# Patient Record
Sex: FEMALE | Race: BLACK OR AFRICAN AMERICAN | Employment: FULL TIME | URBAN - METROPOLITAN AREA
[De-identification: names, ages, dates, MRNs, and addresses within clinical notes are randomized per-mention and may not be internally consistent; named-entity substitution may affect disease eponyms.]

---

## 2022-04-26 ENCOUNTER — APPOINTMENT (OUTPATIENT)
Dept: GENERAL RADIOLOGY | Age: 42
End: 2022-04-26
Attending: EMERGENCY MEDICINE
Payer: MEDICAID

## 2022-04-26 ENCOUNTER — APPOINTMENT (OUTPATIENT)
Dept: CT IMAGING | Age: 42
End: 2022-04-26
Attending: EMERGENCY MEDICINE
Payer: MEDICAID

## 2022-04-26 ENCOUNTER — HOSPITAL ENCOUNTER (OUTPATIENT)
Age: 42
Setting detail: OBSERVATION
Discharge: HOME | End: 2022-04-28
Attending: EMERGENCY MEDICINE | Admitting: FAMILY MEDICINE
Payer: MEDICAID

## 2022-04-26 DIAGNOSIS — R77.8 ELEVATED TROPONIN: ICD-10-CM

## 2022-04-26 DIAGNOSIS — M54.2 NECK PAIN ON LEFT SIDE: ICD-10-CM

## 2022-04-26 DIAGNOSIS — R94.31 ABNORMAL EKG: ICD-10-CM

## 2022-04-26 DIAGNOSIS — R53.1 LEFT-SIDED WEAKNESS: Primary | ICD-10-CM

## 2022-04-26 LAB
ALBUMIN SERPL-MCNC: 4 G/DL (ref 3.4–5)
ALBUMIN/GLOB SERPL: 1.2 {RATIO} (ref 0.8–1.7)
ALP SERPL-CCNC: 81 U/L (ref 45–117)
ALT SERPL-CCNC: 18 U/L (ref 13–56)
AMPHET UR QL SCN: NEGATIVE
ANION GAP SERPL CALC-SCNC: 1 MMOL/L (ref 3–18)
APPEARANCE UR: CLEAR
APTT PPP: 23.8 SEC (ref 23–36.4)
AST SERPL-CCNC: 21 U/L (ref 10–38)
ATRIAL RATE: 89 BPM
ATRIAL RATE: 93 BPM
BARBITURATES UR QL SCN: NEGATIVE
BASOPHILS # BLD: 0 K/UL (ref 0–0.1)
BASOPHILS NFR BLD: 1 % (ref 0–2)
BENZODIAZ UR QL: NEGATIVE
BILIRUB DIRECT SERPL-MCNC: 0.1 MG/DL (ref 0–0.2)
BILIRUB SERPL-MCNC: 0.3 MG/DL (ref 0.2–1)
BILIRUB UR QL: NEGATIVE
BUN SERPL-MCNC: 10 MG/DL (ref 7–18)
BUN/CREAT SERPL: 10 (ref 12–20)
CALCIUM SERPL-MCNC: 9 MG/DL (ref 8.5–10.1)
CALCULATED P AXIS, ECG09: 58 DEGREES
CALCULATED P AXIS, ECG09: 68 DEGREES
CALCULATED R AXIS, ECG10: 25 DEGREES
CALCULATED R AXIS, ECG10: 43 DEGREES
CALCULATED T AXIS, ECG11: 25 DEGREES
CALCULATED T AXIS, ECG11: 46 DEGREES
CANNABINOIDS UR QL SCN: POSITIVE
CHLORIDE SERPL-SCNC: 105 MMOL/L (ref 100–111)
CO2 SERPL-SCNC: 29 MMOL/L (ref 21–32)
COCAINE UR QL SCN: NEGATIVE
COLOR UR: YELLOW
CREAT SERPL-MCNC: 1 MG/DL (ref 0.6–1.3)
DIAGNOSIS, 93000: NORMAL
DIAGNOSIS, 93000: NORMAL
DIFFERENTIAL METHOD BLD: ABNORMAL
EOSINOPHIL # BLD: 0.2 K/UL (ref 0–0.4)
EOSINOPHIL NFR BLD: 3 % (ref 0–5)
ERYTHROCYTE [DISTWIDTH] IN BLOOD BY AUTOMATED COUNT: 16.9 % (ref 11.6–14.5)
GLOBULIN SER CALC-MCNC: 3.3 G/DL (ref 2–4)
GLUCOSE BLD STRIP.AUTO-MCNC: 99 MG/DL (ref 70–110)
GLUCOSE SERPL-MCNC: 83 MG/DL (ref 74–99)
GLUCOSE UR STRIP.AUTO-MCNC: NEGATIVE MG/DL
HCG SERPL QL: NEGATIVE
HCT VFR BLD AUTO: 35.8 % (ref 35–45)
HDSCOM,HDSCOM: ABNORMAL
HGB BLD-MCNC: 10.6 G/DL (ref 12–16)
HGB UR QL STRIP: NEGATIVE
IMM GRANULOCYTES # BLD AUTO: 0 K/UL (ref 0–0.04)
IMM GRANULOCYTES NFR BLD AUTO: 0 % (ref 0–0.5)
INR PPP: 1.1 (ref 0.8–1.2)
KETONES UR QL STRIP.AUTO: NEGATIVE MG/DL
LEUKOCYTE ESTERASE UR QL STRIP.AUTO: NEGATIVE
LYMPHOCYTES # BLD: 3.2 K/UL (ref 0.9–3.6)
LYMPHOCYTES NFR BLD: 51 % (ref 21–52)
MCH RBC QN AUTO: 23.4 PG (ref 24–34)
MCHC RBC AUTO-ENTMCNC: 29.6 G/DL (ref 31–37)
MCV RBC AUTO: 79 FL (ref 78–100)
METHADONE UR QL: NEGATIVE
MONOCYTES # BLD: 0.6 K/UL (ref 0.05–1.2)
MONOCYTES NFR BLD: 9 % (ref 3–10)
NEUTS SEG # BLD: 2.3 K/UL (ref 1.8–8)
NEUTS SEG NFR BLD: 36 % (ref 40–73)
NITRITE UR QL STRIP.AUTO: NEGATIVE
NRBC # BLD: 0 K/UL (ref 0–0.01)
NRBC BLD-RTO: 0 PER 100 WBC
OPIATES UR QL: POSITIVE
P-R INTERVAL, ECG05: 188 MS
P-R INTERVAL, ECG05: 198 MS
PCP UR QL: NEGATIVE
PH UR STRIP: 7 [PH] (ref 5–8)
PLATELET # BLD AUTO: 315 K/UL (ref 135–420)
PMV BLD AUTO: 9.8 FL (ref 9.2–11.8)
POTASSIUM SERPL-SCNC: 4.2 MMOL/L (ref 3.5–5.5)
PROT SERPL-MCNC: 7.3 G/DL (ref 6.4–8.2)
PROT UR STRIP-MCNC: NEGATIVE MG/DL
PROTHROMBIN TIME: 13.2 SEC (ref 11.5–15.2)
Q-T INTERVAL, ECG07: 350 MS
Q-T INTERVAL, ECG07: 372 MS
QRS DURATION, ECG06: 66 MS
QRS DURATION, ECG06: 76 MS
QTC CALCULATION (BEZET), ECG08: 435 MS
QTC CALCULATION (BEZET), ECG08: 452 MS
RBC # BLD AUTO: 4.53 M/UL (ref 4.2–5.3)
SODIUM SERPL-SCNC: 135 MMOL/L (ref 136–145)
SP GR UR REFRACTOMETRY: >1.03 (ref 1–1.03)
TROPONIN-HIGH SENSITIVITY: 107 NG/L (ref 0–54)
TROPONIN-HIGH SENSITIVITY: 93 NG/L (ref 0–54)
UROBILINOGEN UR QL STRIP.AUTO: 0.2 EU/DL (ref 0.2–1)
VENTRICULAR RATE, ECG03: 89 BPM
VENTRICULAR RATE, ECG03: 93 BPM
WBC # BLD AUTO: 6.2 K/UL (ref 4.6–13.2)

## 2022-04-26 PROCEDURE — 96366 THER/PROPH/DIAG IV INF ADDON: CPT

## 2022-04-26 PROCEDURE — 74011250637 HC RX REV CODE- 250/637: Performed by: EMERGENCY MEDICINE

## 2022-04-26 PROCEDURE — 96376 TX/PRO/DX INJ SAME DRUG ADON: CPT

## 2022-04-26 PROCEDURE — 85730 THROMBOPLASTIN TIME PARTIAL: CPT

## 2022-04-26 PROCEDURE — 74011000250 HC RX REV CODE- 250: Performed by: FAMILY MEDICINE

## 2022-04-26 PROCEDURE — 80076 HEPATIC FUNCTION PANEL: CPT

## 2022-04-26 PROCEDURE — 93005 ELECTROCARDIOGRAM TRACING: CPT

## 2022-04-26 PROCEDURE — 65270000046 HC RM TELEMETRY

## 2022-04-26 PROCEDURE — G0378 HOSPITAL OBSERVATION PER HR: HCPCS

## 2022-04-26 PROCEDURE — 94762 N-INVAS EAR/PLS OXIMTRY CONT: CPT

## 2022-04-26 PROCEDURE — 71045 X-RAY EXAM CHEST 1 VIEW: CPT

## 2022-04-26 PROCEDURE — 74011250637 HC RX REV CODE- 250/637: Performed by: FAMILY MEDICINE

## 2022-04-26 PROCEDURE — 82962 GLUCOSE BLOOD TEST: CPT

## 2022-04-26 PROCEDURE — 99285 EMERGENCY DEPT VISIT HI MDM: CPT

## 2022-04-26 PROCEDURE — 74011250636 HC RX REV CODE- 250/636: Performed by: EMERGENCY MEDICINE

## 2022-04-26 PROCEDURE — 96375 TX/PRO/DX INJ NEW DRUG ADDON: CPT

## 2022-04-26 PROCEDURE — 85025 COMPLETE CBC W/AUTO DIFF WBC: CPT

## 2022-04-26 PROCEDURE — 80048 BASIC METABOLIC PNL TOTAL CA: CPT

## 2022-04-26 PROCEDURE — 84703 CHORIONIC GONADOTROPIN ASSAY: CPT

## 2022-04-26 PROCEDURE — 85610 PROTHROMBIN TIME: CPT

## 2022-04-26 PROCEDURE — 84484 ASSAY OF TROPONIN QUANT: CPT

## 2022-04-26 PROCEDURE — 70450 CT HEAD/BRAIN W/O DYE: CPT

## 2022-04-26 PROCEDURE — 96365 THER/PROPH/DIAG IV INF INIT: CPT

## 2022-04-26 PROCEDURE — 74011000636 HC RX REV CODE- 636: Performed by: EMERGENCY MEDICINE

## 2022-04-26 PROCEDURE — 80307 DRUG TEST PRSMV CHEM ANLYZR: CPT

## 2022-04-26 PROCEDURE — 71275 CT ANGIOGRAPHY CHEST: CPT

## 2022-04-26 PROCEDURE — 70496 CT ANGIOGRAPHY HEAD: CPT

## 2022-04-26 PROCEDURE — 81003 URINALYSIS AUTO W/O SCOPE: CPT

## 2022-04-26 RX ORDER — MAGNESIUM SULFATE HEPTAHYDRATE 40 MG/ML
2 INJECTION, SOLUTION INTRAVENOUS ONCE
Status: COMPLETED | OUTPATIENT
Start: 2022-04-26 | End: 2022-04-26

## 2022-04-26 RX ORDER — MORPHINE SULFATE 4 MG/ML
4 INJECTION INTRAVENOUS
Status: COMPLETED | OUTPATIENT
Start: 2022-04-26 | End: 2022-04-26

## 2022-04-26 RX ORDER — ONDANSETRON 4 MG/1
4 TABLET, ORALLY DISINTEGRATING ORAL
Status: DISCONTINUED | OUTPATIENT
Start: 2022-04-26 | End: 2022-04-28 | Stop reason: HOSPADM

## 2022-04-26 RX ORDER — ONDANSETRON 2 MG/ML
4 INJECTION INTRAMUSCULAR; INTRAVENOUS
Status: DISCONTINUED | OUTPATIENT
Start: 2022-04-26 | End: 2022-04-28 | Stop reason: HOSPADM

## 2022-04-26 RX ORDER — POLYETHYLENE GLYCOL 3350 17 G/17G
17 POWDER, FOR SOLUTION ORAL DAILY PRN
Status: DISCONTINUED | OUTPATIENT
Start: 2022-04-26 | End: 2022-04-28 | Stop reason: HOSPADM

## 2022-04-26 RX ORDER — SODIUM CHLORIDE 0.9 % (FLUSH) 0.9 %
5-40 SYRINGE (ML) INJECTION AS NEEDED
Status: DISCONTINUED | OUTPATIENT
Start: 2022-04-26 | End: 2022-04-28 | Stop reason: HOSPADM

## 2022-04-26 RX ORDER — ACETAMINOPHEN 650 MG/1
650 SUPPOSITORY RECTAL
Status: DISCONTINUED | OUTPATIENT
Start: 2022-04-26 | End: 2022-04-28 | Stop reason: HOSPADM

## 2022-04-26 RX ORDER — LORAZEPAM 2 MG/ML
1 INJECTION INTRAMUSCULAR
Status: COMPLETED | OUTPATIENT
Start: 2022-04-26 | End: 2022-04-26

## 2022-04-26 RX ORDER — SODIUM CHLORIDE 0.9 % (FLUSH) 0.9 %
5-40 SYRINGE (ML) INJECTION EVERY 8 HOURS
Status: DISCONTINUED | OUTPATIENT
Start: 2022-04-26 | End: 2022-04-28 | Stop reason: HOSPADM

## 2022-04-26 RX ORDER — ACETAMINOPHEN 325 MG/1
650 TABLET ORAL
Status: DISCONTINUED | OUTPATIENT
Start: 2022-04-26 | End: 2022-04-28 | Stop reason: HOSPADM

## 2022-04-26 RX ORDER — ENOXAPARIN SODIUM 100 MG/ML
40 INJECTION SUBCUTANEOUS DAILY
Status: DISCONTINUED | OUTPATIENT
Start: 2022-04-27 | End: 2022-04-28 | Stop reason: HOSPADM

## 2022-04-26 RX ORDER — FAMOTIDINE 20 MG/1
20 TABLET, FILM COATED ORAL 2 TIMES DAILY
Status: DISCONTINUED | OUTPATIENT
Start: 2022-04-26 | End: 2022-04-28 | Stop reason: HOSPADM

## 2022-04-26 RX ORDER — ASPIRIN 325 MG
325 TABLET ORAL
Status: COMPLETED | OUTPATIENT
Start: 2022-04-26 | End: 2022-04-26

## 2022-04-26 RX ADMIN — IOPAMIDOL 100 ML: 755 INJECTION, SOLUTION INTRAVENOUS at 20:27

## 2022-04-26 RX ADMIN — ASPIRIN 325 MG ORAL TABLET 325 MG: 325 PILL ORAL at 21:50

## 2022-04-26 RX ADMIN — LORAZEPAM 1 MG: 2 INJECTION INTRAMUSCULAR at 18:32

## 2022-04-26 RX ADMIN — SODIUM CHLORIDE 1000 ML: 9 INJECTION, SOLUTION INTRAVENOUS at 19:37

## 2022-04-26 RX ADMIN — SODIUM CHLORIDE 1000 ML: 900 INJECTION, SOLUTION INTRAVENOUS at 18:32

## 2022-04-26 RX ADMIN — MORPHINE SULFATE 4 MG: 4 INJECTION INTRAVENOUS at 19:54

## 2022-04-26 RX ADMIN — MORPHINE SULFATE 4 MG: 4 INJECTION INTRAVENOUS at 21:50

## 2022-04-26 RX ADMIN — SODIUM CHLORIDE, PRESERVATIVE FREE 10 ML: 5 INJECTION INTRAVENOUS at 22:16

## 2022-04-26 RX ADMIN — IOPAMIDOL 100 ML: 755 INJECTION, SOLUTION INTRAVENOUS at 17:45

## 2022-04-26 RX ADMIN — MAGNESIUM SULFATE HEPTAHYDRATE 2 G: 40 INJECTION, SOLUTION INTRAVENOUS at 18:32

## 2022-04-26 RX ADMIN — NITROGLYCERIN 0.5 INCH: 20 OINTMENT TOPICAL at 22:12

## 2022-04-26 NOTE — ED NOTES
Pt  To CT with Rn  Earrings/necklace with mom  Pt c/o Lneck-arm pain with elbow numbness starting at 1300  [id number]

## 2022-04-26 NOTE — ED PROVIDER NOTES
EMERGENCY DEPARTMENT HISTORY AND PHYSICAL EXAM    Date: 4/26/2022  Patient Name: Bebeto Lopez    History of Presenting Illness     Chief Complaint   Patient presents with    Shortness of Breath    Neck Pain         History Provided By: Patient and Patient's Mother    5:25 PM  Bebeto Lopez is a 43 y.o. female with PMHX of prior appendectomy who presents to the emergency department C/O left-sided pain and weakness. Patient reports she was feeling well when she woke up at 7 AM today but then started to have pain over the left posterior part of her neck. She reports this is steadily worsened throughout the day and is now spreading down her left arm and the left side of her back and her left leg and into the left side of her head. She reports she is weak on the left side and has difficulty moving her left arm and left leg as well. She denies numbness, head strike, blood thinner use. No fever or cough but is complaining of chest pain and shortness of breath. Denies any recent injuries or trauma. Denies any hormone or birth control use. No family history of heart disease. Does smoke.      PCP: None    Current Facility-Administered Medications   Medication Dose Route Frequency Provider Last Rate Last Admin    nitroglycerin (NITROBID) 2 % ointment 0.5 Inch  0.5 Inch Topical BID Aleksandr Dailey MD   0.5 Inch at 04/26/22 2212    sodium chloride (NS) flush 5-40 mL  5-40 mL IntraVENous Q8H Aleksandr Dailey MD        sodium chloride (NS) flush 5-40 mL  5-40 mL IntraVENous PRN Aleksandr Dailey MD        acetaminophen (TYLENOL) tablet 650 mg  650 mg Oral Q6H PRN Alekasndr Dailey MD        Or    acetaminophen (TYLENOL) suppository 650 mg  650 mg Rectal Q6H PRN Aleksandr Dailey MD        polyethylene glycol (MIRALAX) packet 17 g  17 g Oral DAILY PRN Aelksandr Dailey MD        ondansetron (ZOFRAN ODT) tablet 4 mg  4 mg Oral Q8H PRN Aleksandr Dailey MD        Or    ondansetron [facility]) injection 4 mg  4 mg IntraVENous Q6H PRN Jason Godoy MD        [START ON 2022] enoxaparin (LOVENOX) injection 40 mg  40 mg SubCUTAneous DAILY Jason Godoy MD        famotidine (PEPCID) tablet 20 mg  20 mg Oral BID Jason Godoy MD           Past History       Past Medical History:  History reviewed. No pertinent past medical history. Past Surgical History:  Past Surgical History:   Procedure Laterality Date    HX APPENDECTOMY      HX GYN       x 3    HX ORTHOPAEDIC      knee       Family History:  History reviewed. No pertinent family history. Social History:  Social History     Tobacco Use    Smoking status: Current Every Day Smoker     Packs/day: 0.50    Smokeless tobacco: Never Used   Substance Use Topics    Alcohol use: Yes    Drug use: Never       Allergies:  No Known Allergies      Review of Systems   Review of Systems   Constitutional: Negative for fever. Respiratory: Positive for shortness of breath. Cardiovascular: Positive for chest pain. Gastrointestinal: Negative for abdominal pain. Musculoskeletal: Positive for arthralgias, back pain, myalgias and neck pain. Neurological: Positive for weakness and headaches. All other systems reviewed and are negative.         Physical Exam     Vitals:    22 1715 22 2102 22 2212 22 2232   BP: (!) 152/80 (!) 140/94 (!) 137/90 137/87   Pulse: 94 80 90 80   Resp: 18 19  15   Temp: 97.3 °F (36.3 °C)      SpO2: 98% 99%  100%   Weight: 79.8 kg (176 lb)      Height: 5' 2\" (1.575 m)        Physical Exam    Nursing notes and vital signs reviewed    Constitutional: Non toxic appearing, moderate distress  Head: Normocephalic, Atraumatic  Eyes: EOMI  Neck: Supple  Cardiovascular: Regular rate and rhythm, no murmurs, rubs, or gallops  Chest: Normal work of breathing and chest excursion bilaterally  Lungs: Clear to ausculation bilaterally  Abdomen: Soft, non tender, non distended  Back: No evidence of trauma or deformity  Extremities: No evidence of trauma or deformity, no LE edema  Skin: Warm and dry, normal cap refill  Neuro: Alert and appropriate, CN intact, normal speech, patient unable to lift left arm or left leg off the bed. Unclear if this is due to pain, sensation is intact in both his extremities.  strength is also reduced in the left hand. Psychiatric: Anxious mood and affect      Diagnostic Study Results     Labs -     Recent Results (from the past 12 hour(s))   GLUCOSE, POC    Collection Time: 04/26/22  5:29 PM   Result Value Ref Range    Glucose (POC) 99 70 - 110 mg/dL   CBC WITH AUTOMATED DIFF    Collection Time: 04/26/22  6:15 PM   Result Value Ref Range    WBC 6.2 4.6 - 13.2 K/uL    RBC 4.53 4.20 - 5.30 M/uL    HGB 10.6 (L) 12.0 - 16.0 g/dL    HCT 35.8 35.0 - 45.0 %    MCV 79.0 78.0 - 100.0 FL    MCH 23.4 (L) 24.0 - 34.0 PG    MCHC 29.6 (L) 31.0 - 37.0 g/dL    RDW 16.9 (H) 11.6 - 14.5 %    PLATELET 761 897 - 154 K/uL    MPV 9.8 9.2 - 11.8 FL    NRBC 0.0 0  WBC    ABSOLUTE NRBC 0.00 0.00 - 0.01 K/uL    NEUTROPHILS 36 (L) 40 - 73 %    LYMPHOCYTES 51 21 - 52 %    MONOCYTES 9 3 - 10 %    EOSINOPHILS 3 0 - 5 %    BASOPHILS 1 0 - 2 %    IMMATURE GRANULOCYTES 0 0.0 - 0.5 %    ABS. NEUTROPHILS 2.3 1.8 - 8.0 K/UL    ABS. LYMPHOCYTES 3.2 0.9 - 3.6 K/UL    ABS. MONOCYTES 0.6 0.05 - 1.2 K/UL    ABS. EOSINOPHILS 0.2 0.0 - 0.4 K/UL    ABS. BASOPHILS 0.0 0.0 - 0.1 K/UL    ABS. IMM.  GRANS. 0.0 0.00 - 0.04 K/UL    DF AUTOMATED     METABOLIC PANEL, BASIC    Collection Time: 04/26/22  6:15 PM   Result Value Ref Range    Sodium 135 (L) 136 - 145 mmol/L    Potassium 4.2 3.5 - 5.5 mmol/L    Chloride 105 100 - 111 mmol/L    CO2 29 21 - 32 mmol/L    Anion gap 1 (L) 3.0 - 18 mmol/L    Glucose 83 74 - 99 mg/dL    BUN 10 7.0 - 18 MG/DL    Creatinine 1.00 0.6 - 1.3 MG/DL    BUN/Creatinine ratio 10 (L) 12 - 20      GFR est AA >60 >60 ml/min/1.73m2    GFR est non-AA >60 >60 ml/min/1.73m2    Calcium 9.0 8.5 - 10.1 MG/DL   PROTHROMBIN TIME + INR    Collection Time: 04/26/22  6:15 PM   Result Value Ref Range    Prothrombin time 13.2 11.5 - 15.2 sec    INR 1.1 0.8 - 1.2     TROPONIN-HIGH SENSITIVITY    Collection Time: 04/26/22  6:15 PM   Result Value Ref Range    Troponin-High Sensitivity 107 (HH) 0 - 54 ng/L   HCG QL SERUM    Collection Time: 04/26/22  6:15 PM   Result Value Ref Range    HCG, Ql. Negative NEG     PTT    Collection Time: 04/26/22  6:15 PM   Result Value Ref Range    aPTT 23.8 23.0 - 36.4 SEC   HEPATIC FUNCTION PANEL    Collection Time: 04/26/22  6:15 PM   Result Value Ref Range    Protein, total 7.3 6.4 - 8.2 g/dL    Albumin 4.0 3.4 - 5.0 g/dL    Globulin 3.3 2.0 - 4.0 g/dL    A-G Ratio 1.2 0.8 - 1.7      Bilirubin, total 0.3 0.2 - 1.0 MG/DL    Bilirubin, direct 0.1 0.0 - 0.2 MG/DL    Alk.  phosphatase 81 45 - 117 U/L    AST (SGOT) 21 10 - 38 U/L    ALT (SGPT) 18 13 - 56 U/L   EKG, 12 LEAD, INITIAL    Collection Time: 04/26/22  6:15 PM   Result Value Ref Range    Ventricular Rate 93 BPM    Atrial Rate 93 BPM    P-R Interval 198 ms    QRS Duration 76 ms    Q-T Interval 350 ms    QTC Calculation (Bezet) 435 ms    Calculated P Axis 68 degrees    Calculated R Axis 43 degrees    Calculated T Axis 46 degrees    Diagnosis       Poor data quality, interpretation may be adversely affected  Normal sinus rhythm  Possible Left atrial enlargement  Septal infarct , age undetermined  Abnormal ECG  Confirmed by Yelena Self MD, Shay Molina ([id number]) on 4/26/2022 10:09:22 PM     TROPONIN-HIGH SENSITIVITY    Collection Time: 04/26/22  7:38 PM   Result Value Ref Range    Troponin-High Sensitivity 93 (HH) 0 - 54 ng/L   EKG, 12 LEAD, INITIAL    Collection Time: 04/26/22  7:49 PM   Result Value Ref Range    Ventricular Rate 89 BPM    Atrial Rate 89 BPM    P-R Interval 188 ms    QRS Duration 66 ms    Q-T Interval 372 ms    QTC Calculation (Bezet) 452 ms    Calculated P Axis 58 degrees    Calculated R Axis 25 degrees    Calculated T Axis 25 degrees    Diagnosis       Poor data quality, interpretation may be adversely affected  Normal sinus rhythm  Possible Anteroseptal infarct , age undetermined  Abnormal ECG  Confirmed by Pop Fraser MD, Katheran Seat ([id number]) on 4/26/2022 10:10:22 PM     URINALYSIS W/ RFLX MICROSCOPIC    Collection Time: 04/26/22  8:15 PM   Result Value Ref Range    Color YELLOW      Appearance CLEAR      Specific gravity >1.030 (H) 1.005 - 1.030    pH (UA) 7.0 5.0 - 8.0      Protein Negative NEG mg/dL    Glucose Negative NEG mg/dL    Ketone Negative NEG mg/dL    Bilirubin Negative NEG      Blood Negative NEG      Urobilinogen 0.2 0.2 - 1.0 EU/dL    Nitrites Negative NEG      Leukocyte Esterase Negative NEG     DRUG SCREEN, URINE    Collection Time: 04/26/22  9:30 PM   Result Value Ref Range    BENZODIAZEPINES Negative NEG      BARBITURATES Negative NEG      THC (TH-CANNABINOL) Positive (A) NEG      OPIATES Positive (A) NEG      PCP(PHENCYCLIDINE) Negative NEG      COCAINE Negative NEG      AMPHETAMINES Negative NEG      METHADONE Negative NEG      HDSCOM (NOTE)        Radiologic Studies -   CTA CHEST ABD PELV W CONT   Final Result      No acute vascular or nonvascular abnormality identified. Mildly enlarged right axillary/prepectoral lymph node measuring 1.6 cm is   nonspecific, likely reactive in nature. Recommend clinical follow-up. No other   suspicious adenopathy or lesion identified. CT HEAD WO CONT   Final Result      1. No acute intracranial pathology. Above code S stroke alert results relayed to patient's ER provider at 5:45 PM   4/26/2022. CTA HEAD NECK W WO CONT    (Results Pending)   XR CHEST PORT    (Results Pending)     CT Results  (Last 48 hours)               04/26/22 2039  CTA CHEST ABD PELV W CONT Final result    Impression:      No acute vascular or nonvascular abnormality identified.        Mildly enlarged right axillary/prepectoral lymph node measuring 1.6 cm is   nonspecific, likely reactive in nature. Recommend clinical follow-up. No other   suspicious adenopathy or lesion identified. Narrative:  EXAM: CT angiogram of the chest, abdomen and pelvis       INDICATION: Chest pain, concern for dissection       COMPARISON: None. Technique: CT arteriogram of the chest abdomen pelvis was performed as a volume   acquisition after intravenous contrast without complication. Coronal and   sagittal MIP reconstructions were performed in order to create angiographic   images of the arterial vasculature and to increase accuracy for detection of   vascular abnormality. One or more dose reduction techniques were used on this   CT: automated exposure control, adjustment of the mAs and/or kVp according to   patient size, and iterative reconstruction techniques. The specific techniques   used on this CT exam have been documented in the patient's electronic medical   record. Digital Imaging and Communications in Medicine (DICOM) format image   data are available to nonaffiliated external healthcare facilities or entities   on a secure, media free, reciprocally searchable basis with patient   authorization for at least a 12-month period after this study. Elton Case FINDINGS       VASCULATURE: Thoracic and abdominal aorta nonaneurysmal. No dissection or acute   abnormality. Visualized great vessels are widely patent. Visualized pulmonary   arteries widely patent. Abdominal visceral and mesenteric vasculature widely   patent. Iliac arterial vasculature widely patent. NONVASCULAR FINDINGS:       CHEST: Unremarkable. MEDIASTINUM: Heart size normal. No pericardial effusion. LIVER, BILIARY: Liver is normal. No biliary dilation. Gallbladder is   unremarkable. PANCREAS: Normal.       SPLEEN: Normal.       ADRENALS: Normal.       KIDNEYS: Normal.       LYMPH NODES: Right axillary/prepectoral lymph node measuring 1.6 cm.        GASTROINTESTINAL TRACT: No bowel dilation or wall thickening. PELVIC ORGANS: Simple appearing right ovarian cyst measuring 3.7 cm. .       BONES: No acute or aggressive osseous abnormalities identified. OTHER: None.       _______________           04/26/22 1737  CT HEAD WO CONT Final result    Impression:      1. No acute intracranial pathology. Above code S stroke alert results relayed to patient's ER provider at 5:45 PM   4/26/2022. Narrative:  EXAMINATION:  CT head noncontrast       COMPARISON: None       HISTORY: Stroke alert, history of stroke like symptoms. TECHNIQUE: Noncontrasted axial images were obtained through the patient's brain   from the vertex of the skull through the skull base. Bone and soft tissue   windows were reviewed. One or more dose reduction techniques were used on this   CT: automated exposure control, adjustment of the mAs and/or kVp according to   patient size, and iterative reconstruction techniques. The specific techniques   used on this CT exam have been documented in the patient's electronic medical   record. Digital Imaging and Communications in Medicine (DICOM) format image   data are available to nonaffiliated external healthcare facilities or entities   on a secure, media free, reciprocally searchable basis with patient   authorization for at least a 12-month period after this study. Kin Velazquezr FINDINGS: Brain architecture is normal. No mass effect, midline shift or   hemorrhage. Ventricles are normal in size, position and configuration. No   abnormal extra-axial fluid collections are seen. No territorial signs of   infarct. The bony calvarium appears intact without acute displaced fracture. The   visualized paranasal sinuses and mastoid air cells are aerated.                CXR Results  (Last 48 hours)    None          Medications given in the ED-  Medications   nitroglycerin (NITROBID) 2 % ointment 0.5 Inch (0.5 Inches Topical Given 4/26/22 2212)   sodium chloride (NS) flush 5-40 mL (has no administration in time range)   sodium chloride (NS) flush 5-40 mL (has no administration in time range)   acetaminophen (TYLENOL) tablet 650 mg (has no administration in time range)     Or   acetaminophen (TYLENOL) suppository 650 mg (has no administration in time range)   polyethylene glycol (MIRALAX) packet 17 g (has no administration in time range)   ondansetron (ZOFRAN ODT) tablet 4 mg (has no administration in time range)     Or   ondansetron (ZOFRAN) injection 4 mg (has no administration in time range)   enoxaparin (LOVENOX) injection 40 mg (has no administration in time range)   famotidine (PEPCID) tablet 20 mg (has no administration in time range)   iopamidoL (ISOVUE-370) 76 % injection 100 mL (100 mL IntraVENous Given 4/26/22 1745)   sodium chloride 0.9 % bolus infusion 1,000 mL (1,000 mL IntraVENous New Bag 4/26/22 1832)   magnesium sulfate 2 g/50 ml IVPB (premix or compounded) (0 g IntraVENous IV Completed 4/26/22 2032)   LORazepam (ATIVAN) injection 1 mg (1 mg IntraVENous Given 4/26/22 1832)   sodium chloride 0.9 % bolus infusion 1,000 mL (1,000 mL IntraVENous New Bag 4/26/22 1937)   morphine injection 4 mg (4 mg IntraVENous Given 4/26/22 1954)   iopamidoL (ISOVUE-370) 76 % injection 100 mL (100 mL IntraVENous Given 4/26/22 2027)   morphine injection 4 mg (4 mg IntraVENous Given 4/26/22 2150)   aspirin tablet 325 mg (325 mg Oral Given 4/26/22 2150)         Medical Decision Making   I am the first provider for this patient. I reviewed the vital signs, available nursing notes, past medical history, past surgical history, family history and social history. Vital Signs-Reviewed the patient's vital signs.     Pulse Oximetry Analysis - 98% on room air, not hypoxic     EKG interpretation: (Preliminary)  EKG read by Dr. Bibi Brewster at 6:19 PM  Normal sinus rhythm at rate of 93 bpm, IN interval 198 ms, QRS duration of 76 ms, no prior available for comparison    Repeat EKG interpretation: (Preliminary)  EKG read by Dr. Xavier Moser at 7:59 PM  Normal sinus rhythm at a rate of 89 bpm, NV interval 180 ms, QRS duration 66 ms, similar compared to prior from earlier today    Records Reviewed: Nursing Notes    Provider Notes (Medical Decision Making): Amanda Henson is a 43 y.o. female presenting for left-sided body pain and weakness. Patient reports symptoms started at 7 AM and the outside of the tPA window was still in potential intervention window so code stroke protocol was initiated. Telemetry neurologist assessed patient and agreed patient is not a tPA candidate and felt symptoms were more likely functional.  Patient's troponin came back elevated. CTA chest was obtained to ensure no dissection was negative. Troponins are downtrending. Discussed with neurology, radiology, hospitalist for further in-hospital evaluation and management. Patient understands and agrees this plan. Procedures:  Procedures    ED Course:   5:25 PM  Code stroke protocol initiated    CONSULT NOTE:   5:37 PM  Dr. Xavier Moser spoke with Dr. Ashlee Pitts   Specialty: Tele-neurologist  Discussed pt's hx, disposition, and available diagnostic and imaging results over the telephone. Reviewed care plans. Will consult on the patient. CONSULT NOTE:   6:18 PM  Dr. Xavier Moser spoke with Dr. Ashlee Pitts   Specialty: Tele-neurologist  Discussed pt's hx, disposition, and available diagnostic and imaging results over the telephone. Reviewed care plans. Exam consistent with functional etiology, MRI brain and C-spine and T-spine with and without contrast, IV saline, IV mag. CONSULT NOTE:   7:26 PM  Dr. Xavier Moser spoke with Dr. Criselad Pierce   Specialty: Neurology  Discussed pt's hx, disposition, and available diagnostic and imaging results over the telephone. Reviewed care plans. Will consult and get MRI's in the morning.      CONSULT NOTE:   7:43 PM  Dr. Xavier Moser spoke with Dr. Sage Vargas   Specialty: Hospitalist  Discussed pt's hx, disposition, and available diagnostic and imaging results over the telephone. Reviewed care plans. Will admit as long as CTA does not show aortic dissection. CONSULT NOTE:   9:27 PM  Dr. María Milner spoke with Dr. Michael Martinez   Specialty: Cardiology  Discussed pt's hx, disposition, and available diagnostic and imaging results over the telephone. Reviewed care plans. Will consult on the patient, perform urine drug screen. CONSULT NOTE:   9:55 PM  Dr. María Milner spoke with Dr. Ludy Clifford   Specialty: Hospitalist  Discussed pt's hx, disposition, and available diagnostic and imaging results over the telephone. Reviewed care plans. Accepts to telemetry. Diagnosis and Disposition     Critical Care Time: None    Core Measures:  For Hospitalized Patients:    1. Hospitalization Decision Time:  The decision to hospitalize the patient was made by Dr. María Milner at 6:20 PM on 4/26/2022    2. Aspirin: Aspirin was given    11:36 PM  Patient is being admitted to the hospital by Dr. Depe Strauss. The results of their tests and reasons for their admission have been discussed with them and/or available family. They convey agreement and understanding for the need to be admitted and for their admission diagnosis. CONDITIONS ON ADMISSION:  Sepsis is not present at the time of admission. Deep Vein Thrombosis is not present at the time of admission. Thrombosis is not present at the time of admission. Urinary Tract Infection is not present at the time of admission. Pneumonia is not present at the time of admission. MRSA is not present at the time of admission. Wound infection is not present at the time of admission. Pressure Ulcer is not present at the time of admission. CLINICAL IMPRESSION:    1. Left-sided weakness    2. Elevated troponin      _______________________________      Please note that this dictation was completed with [employer] voice recognition software.   Quite often unanticipated grammatical, syntax, homophones, and other interpretive errors are inadvertently transcribed by the computer software. Please disregard these errors. Please excuse any errors that have escaped final proofreading.

## 2022-04-26 NOTE — Clinical Note
Status[de-identified] INPATIENT [101]   Type of Bed: Telemetry [19]   Cardiac Monitoring Required?: Yes   Inpatient Hospitalization Certified Necessary for the Following Reasons: 3.  Patient receiving treatment that can only be provided in an inpatient setting (further clarification in H&P documentation)   Admitting Diagnosis: Left-sided weakness [[id number]]   Admitting Physician: Rosa Oquendo [[id number]]   Attending Physician: Rosa Oquendo [[id number]]   Estimated Length of Stay: 2 Midnights   Discharge Plan[de-identified] Home with Office Follow-up

## 2022-04-27 ENCOUNTER — APPOINTMENT (OUTPATIENT)
Dept: MRI IMAGING | Age: 42
End: 2022-04-27
Attending: FAMILY MEDICINE
Payer: MEDICAID

## 2022-04-27 ENCOUNTER — APPOINTMENT (OUTPATIENT)
Dept: NON INVASIVE DIAGNOSTICS | Age: 42
End: 2022-04-27
Attending: FAMILY MEDICINE
Payer: MEDICAID

## 2022-04-27 PROBLEM — M54.2 NECK PAIN ON LEFT SIDE: Status: ACTIVE | Noted: 2022-04-27

## 2022-04-27 PROBLEM — R59.9 LYMPH NODE ENLARGEMENT: Status: ACTIVE | Noted: 2022-04-27

## 2022-04-27 PROBLEM — E66.9 OBESITY (BMI 30-39.9): Status: ACTIVE | Noted: 2022-04-27

## 2022-04-27 PROBLEM — F12.90 MARIJUANA USE: Status: ACTIVE | Noted: 2022-04-27

## 2022-04-27 PROBLEM — H53.8 BLURRED VISION, BILATERAL: Status: ACTIVE | Noted: 2022-04-27

## 2022-04-27 PROBLEM — R07.9 CHEST PAIN: Status: ACTIVE | Noted: 2022-04-27

## 2022-04-27 PROBLEM — F17.200 SMOKER: Status: ACTIVE | Noted: 2022-04-27

## 2022-04-27 PROBLEM — R77.8 ELEVATED TROPONIN: Status: ACTIVE | Noted: 2022-04-27

## 2022-04-27 PROBLEM — H51.8 DYSCONJUGATE GAZE: Status: ACTIVE | Noted: 2022-04-27

## 2022-04-27 PROBLEM — R29.898 LEFT ARM WEAKNESS: Status: ACTIVE | Noted: 2022-04-27

## 2022-04-27 PROBLEM — R79.89 ELEVATED TROPONIN: Status: ACTIVE | Noted: 2022-04-27

## 2022-04-27 LAB
ALBUMIN SERPL-MCNC: 3.9 G/DL (ref 3.4–5)
ALBUMIN/GLOB SERPL: 1.2 {RATIO} (ref 0.8–1.7)
ALP SERPL-CCNC: 71 U/L (ref 45–117)
ALT SERPL-CCNC: 19 U/L (ref 13–56)
ANION GAP SERPL CALC-SCNC: 5 MMOL/L (ref 3–18)
AST SERPL-CCNC: 20 U/L (ref 10–38)
BILIRUB SERPL-MCNC: 0.3 MG/DL (ref 0.2–1)
BUN SERPL-MCNC: 7 MG/DL (ref 7–18)
BUN/CREAT SERPL: 10 (ref 12–20)
CALCIUM SERPL-MCNC: 8.8 MG/DL (ref 8.5–10.1)
CHLORIDE SERPL-SCNC: 106 MMOL/L (ref 100–111)
CHOLEST SERPL-MCNC: 155 MG/DL
CO2 SERPL-SCNC: 26 MMOL/L (ref 21–32)
CREAT SERPL-MCNC: 0.69 MG/DL (ref 0.6–1.3)
ECHO AO ROOT DIAM: 2.7 CM
ECHO AO ROOT INDEX: 1.49 CM/M2
ECHO AV MEAN GRADIENT: 5 MMHG
ECHO AV MEAN VELOCITY: 1 M/S
ECHO AV PEAK GRADIENT: 7 MMHG
ECHO AV PEAK VELOCITY: 1.4 M/S
ECHO AV VTI: 29.1 CM
ECHO LA DIAMETER INDEX: 1.49 CM/M2
ECHO LA DIAMETER: 2.7 CM
ECHO LA TO AORTIC ROOT RATIO: 1
ECHO LA VOL 4C: 22 ML (ref 22–52)
ECHO LA VOLUME INDEX A4C: 12 ML/M2 (ref 16–34)
ECHO LV E' LATERAL VELOCITY: 12 CM/S
ECHO LV E' SEPTAL VELOCITY: 9 CM/S
ECHO LV EDV A2C: 59 ML
ECHO LV EDV A4C: 80 ML
ECHO LV EDV BP: 68 ML (ref 56–104)
ECHO LV EDV INDEX A4C: 44 ML/M2
ECHO LV EDV INDEX BP: 38 ML/M2
ECHO LV EDV NDEX A2C: 33 ML/M2
ECHO LV EJECTION FRACTION A2C: 74 %
ECHO LV EJECTION FRACTION A4C: 69 %
ECHO LV EJECTION FRACTION BIPLANE: 71 % (ref 55–100)
ECHO LV ESV A2C: 16 ML
ECHO LV ESV A4C: 25 ML
ECHO LV ESV BP: 20 ML (ref 19–49)
ECHO LV ESV INDEX A2C: 9 ML/M2
ECHO LV ESV INDEX A4C: 14 ML/M2
ECHO LV ESV INDEX BP: 11 ML/M2
ECHO LV FRACTIONAL SHORTENING: 40 % (ref 28–44)
ECHO LV GLOBAL LONGITUDINAL STRAIN (GLS): -17 %
ECHO LV GLOBAL LONGITUDINAL STRAIN (GLS): -18.3 %
ECHO LV GLOBAL LONGITUDINAL STRAIN (GLS): -18.5 %
ECHO LV GLOBAL LONGITUDINAL STRAIN (GLS): -20.3 %
ECHO LV INTERNAL DIMENSION DIASTOLE INDEX: 2.21 CM/M2
ECHO LV INTERNAL DIMENSION DIASTOLIC: 4 CM (ref 3.9–5.3)
ECHO LV INTERNAL DIMENSION SYSTOLIC INDEX: 1.33 CM/M2
ECHO LV INTERNAL DIMENSION SYSTOLIC: 2.4 CM
ECHO LV IVSD: 1 CM (ref 0.6–0.9)
ECHO LV MASS 2D: 118.2 G (ref 67–162)
ECHO LV MASS INDEX 2D: 65.3 G/M2 (ref 43–95)
ECHO LV POSTERIOR WALL DIASTOLIC: 0.9 CM (ref 0.6–0.9)
ECHO LV RELATIVE WALL THICKNESS RATIO: 0.45
ECHO LVOT AREA: 3.1 CM2
ECHO LVOT DIAM: 2 CM
ECHO MV A VELOCITY: 0.91 M/S
ECHO MV E VELOCITY: 0.92 M/S
ECHO MV E/A RATIO: 1.01
ECHO MV E/E' LATERAL: 7.67
ECHO MV E/E' RATIO (AVERAGED): 8.94
ECHO MV E/E' SEPTAL: 10.22
ECHO PULMONARY ARTERY END DIASTOLIC PRESSURE: 3 MMHG
ECHO PV REGURGITANT MAX VELOCITY: 0.9 M/S
ECHO RV FREE WALL PEAK S': 14 CM/S
ECHO RV INTERNAL DIMENSION: 3.1 CM
ECHO RV TAPSE: 2.2 CM (ref 1.7–?)
ERYTHROCYTE [DISTWIDTH] IN BLOOD BY AUTOMATED COUNT: 16.7 % (ref 11.6–14.5)
GLOBULIN SER CALC-MCNC: 3.3 G/DL (ref 2–4)
GLUCOSE BLD STRIP.AUTO-MCNC: 107 MG/DL (ref 70–110)
GLUCOSE SERPL-MCNC: 132 MG/DL (ref 74–99)
HBA1C MFR BLD: 5.2 % (ref 4.2–5.6)
HCT VFR BLD AUTO: 35 % (ref 35–45)
HDLC SERPL-MCNC: 72 MG/DL (ref 40–60)
HDLC SERPL: 2.2 {RATIO} (ref 0–5)
HGB BLD-MCNC: 10.5 G/DL (ref 12–16)
LDLC SERPL CALC-MCNC: 74.8 MG/DL (ref 0–100)
LIPID PROFILE,FLP: ABNORMAL
MCH RBC QN AUTO: 24.1 PG (ref 24–34)
MCHC RBC AUTO-ENTMCNC: 30 G/DL (ref 31–37)
MCV RBC AUTO: 80.5 FL (ref 78–100)
NRBC # BLD: 0 K/UL (ref 0–0.01)
NRBC BLD-RTO: 0 PER 100 WBC
PLATELET # BLD AUTO: 295 K/UL (ref 135–420)
PMV BLD AUTO: 10 FL (ref 9.2–11.8)
POTASSIUM SERPL-SCNC: 3.9 MMOL/L (ref 3.5–5.5)
PROLACTIN SERPL-MCNC: 31.8 NG/ML
PROT SERPL-MCNC: 7.2 G/DL (ref 6.4–8.2)
RBC # BLD AUTO: 4.35 M/UL (ref 4.2–5.3)
SODIUM SERPL-SCNC: 137 MMOL/L (ref 136–145)
TRIGL SERPL-MCNC: 41 MG/DL (ref ?–150)
TROPONIN-HIGH SENSITIVITY: 69 NG/L (ref 0–54)
TROPONIN-HIGH SENSITIVITY: 86 NG/L (ref 0–54)
TSH SERPL DL<=0.05 MIU/L-ACNC: 3.06 UIU/ML (ref 0.36–3.74)
VLDLC SERPL CALC-MCNC: 8.2 MG/DL
WBC # BLD AUTO: 6.3 K/UL (ref 4.6–13.2)

## 2022-04-27 PROCEDURE — 83036 HEMOGLOBIN GLYCOSYLATED A1C: CPT

## 2022-04-27 PROCEDURE — 84443 ASSAY THYROID STIM HORMONE: CPT

## 2022-04-27 PROCEDURE — 65270000046 HC RM TELEMETRY

## 2022-04-27 PROCEDURE — 84484 ASSAY OF TROPONIN QUANT: CPT

## 2022-04-27 PROCEDURE — 84146 ASSAY OF PROLACTIN: CPT

## 2022-04-27 PROCEDURE — 80061 LIPID PANEL: CPT

## 2022-04-27 PROCEDURE — 72156 MRI NECK SPINE W/O & W/DYE: CPT

## 2022-04-27 PROCEDURE — 96376 TX/PRO/DX INJ SAME DRUG ADON: CPT

## 2022-04-27 PROCEDURE — 80053 COMPREHEN METABOLIC PANEL: CPT

## 2022-04-27 PROCEDURE — 85027 COMPLETE CBC AUTOMATED: CPT

## 2022-04-27 PROCEDURE — 82962 GLUCOSE BLOOD TEST: CPT

## 2022-04-27 PROCEDURE — 72157 MRI CHEST SPINE W/O & W/DYE: CPT

## 2022-04-27 PROCEDURE — 96372 THER/PROPH/DIAG INJ SC/IM: CPT

## 2022-04-27 PROCEDURE — 74011250636 HC RX REV CODE- 250/636: Performed by: FAMILY MEDICINE

## 2022-04-27 PROCEDURE — 96375 TX/PRO/DX INJ NEW DRUG ADDON: CPT

## 2022-04-27 PROCEDURE — 74011250636 HC RX REV CODE- 250/636: Performed by: HOSPITALIST

## 2022-04-27 PROCEDURE — 74011250637 HC RX REV CODE- 250/637: Performed by: FAMILY MEDICINE

## 2022-04-27 PROCEDURE — 74011000250 HC RX REV CODE- 250: Performed by: FAMILY MEDICINE

## 2022-04-27 PROCEDURE — 93306 TTE W/DOPPLER COMPLETE: CPT

## 2022-04-27 PROCEDURE — 70553 MRI BRAIN STEM W/O & W/DYE: CPT

## 2022-04-27 PROCEDURE — 36415 COLL VENOUS BLD VENIPUNCTURE: CPT

## 2022-04-27 PROCEDURE — G0378 HOSPITAL OBSERVATION PER HR: HCPCS

## 2022-04-27 PROCEDURE — A9576 INJ PROHANCE MULTIPACK: HCPCS | Performed by: FAMILY MEDICINE

## 2022-04-27 RX ORDER — GUAIFENESIN 100 MG/5ML
81 LIQUID (ML) ORAL DAILY
Status: DISCONTINUED | OUTPATIENT
Start: 2022-04-27 | End: 2022-04-28 | Stop reason: HOSPADM

## 2022-04-27 RX ORDER — MORPHINE SULFATE 2 MG/ML
2 INJECTION, SOLUTION INTRAMUSCULAR; INTRAVENOUS
Status: DISCONTINUED | OUTPATIENT
Start: 2022-04-27 | End: 2022-04-28 | Stop reason: HOSPADM

## 2022-04-27 RX ORDER — LIDOCAINE 4 G/100G
2 PATCH TOPICAL EVERY 24 HOURS
Status: DISCONTINUED | OUTPATIENT
Start: 2022-04-27 | End: 2022-04-28 | Stop reason: HOSPADM

## 2022-04-27 RX ORDER — LORAZEPAM 2 MG/ML
1 INJECTION INTRAMUSCULAR ONCE
Status: COMPLETED | OUTPATIENT
Start: 2022-04-27 | End: 2022-04-27

## 2022-04-27 RX ORDER — OXYCODONE HYDROCHLORIDE 5 MG/1
10 TABLET ORAL
Status: DISCONTINUED | OUTPATIENT
Start: 2022-04-27 | End: 2022-04-28 | Stop reason: HOSPADM

## 2022-04-27 RX ORDER — SODIUM CHLORIDE, SODIUM LACTATE, POTASSIUM CHLORIDE, CALCIUM CHLORIDE 600; 310; 30; 20 MG/100ML; MG/100ML; MG/100ML; MG/100ML
100 INJECTION, SOLUTION INTRAVENOUS CONTINUOUS
Status: DISCONTINUED | OUTPATIENT
Start: 2022-04-27 | End: 2022-04-27

## 2022-04-27 RX ADMIN — NITROGLYCERIN 0.5 INCH: 20 OINTMENT TOPICAL at 08:37

## 2022-04-27 RX ADMIN — OXYCODONE 10 MG: 5 TABLET ORAL at 07:20

## 2022-04-27 RX ADMIN — FAMOTIDINE 20 MG: 20 TABLET, FILM COATED ORAL at 08:37

## 2022-04-27 RX ADMIN — OXYCODONE 10 MG: 5 TABLET ORAL at 23:30

## 2022-04-27 RX ADMIN — GADOTERIDOL 20 ML: 279.3 INJECTION, SOLUTION INTRAVENOUS at 13:13

## 2022-04-27 RX ADMIN — NITROGLYCERIN 0.5 INCH: 20 OINTMENT TOPICAL at 22:10

## 2022-04-27 RX ADMIN — SODIUM CHLORIDE, SODIUM LACTATE, POTASSIUM CHLORIDE, AND CALCIUM CHLORIDE 100 ML/HR: 600; 310; 30; 20 INJECTION, SOLUTION INTRAVENOUS at 04:00

## 2022-04-27 RX ADMIN — FAMOTIDINE 20 MG: 20 TABLET, FILM COATED ORAL at 00:01

## 2022-04-27 RX ADMIN — FAMOTIDINE 20 MG: 20 TABLET, FILM COATED ORAL at 22:06

## 2022-04-27 RX ADMIN — LORAZEPAM 1 MG: 2 INJECTION INTRAMUSCULAR; INTRAVENOUS at 12:04

## 2022-04-27 RX ADMIN — SODIUM CHLORIDE, PRESERVATIVE FREE 10 ML: 5 INJECTION INTRAVENOUS at 15:26

## 2022-04-27 RX ADMIN — ASPIRIN 81 MG: 81 TABLET, CHEWABLE ORAL at 08:37

## 2022-04-27 RX ADMIN — ONDANSETRON 4 MG: 4 TABLET, ORALLY DISINTEGRATING ORAL at 08:54

## 2022-04-27 RX ADMIN — OXYCODONE 10 MG: 5 TABLET ORAL at 17:42

## 2022-04-27 RX ADMIN — ENOXAPARIN SODIUM 40 MG: 100 INJECTION SUBCUTANEOUS at 08:37

## 2022-04-27 RX ADMIN — SODIUM CHLORIDE, PRESERVATIVE FREE 10 ML: 5 INJECTION INTRAVENOUS at 05:14

## 2022-04-27 RX ADMIN — ONDANSETRON 4 MG: 2 INJECTION INTRAMUSCULAR; INTRAVENOUS at 22:06

## 2022-04-27 NOTE — PROGRESS NOTES
Patient returnedto room via stretcher from MRI. Patient Reports mild discomfort following MRI. Placed on monitor.

## 2022-04-27 NOTE — PROGRESS NOTES
Reason for Admission:  Patient is a 43 yr old who presented with severe left sided neck pain with headache, also had chest pain with left arm numbness and tingling and weakness. RUR Score:       Low, 7%              Plan for utilizing home health:      TBD    PCP: First and Last name:  None     Name of Practice:    Are you a current patient: Yes/No:    Approximate date of last visit:    Can you participate in a virtual visit with your PCP:                     Current Advanced Directive/Advance Care Plan: Full Code      Healthcare Decision Maker:   Click here to complete [address] including selection of the [address] Relationship (ie \"Primary\")                             Transition of Care Plan:      5630: patient off unit when care manager rounded. 1350: Patient still off unit at MRI, spouse and daughter with grandson at bedside. Updated patient record with their contact information; spouse verified no PCP and will offer to assist when patient is able to be interviewed.                 Care Management Interventions  Transition of Care Consult (CM Consult): Discharge Planning  Support Systems: Spouse/Significant Other,Child(bernabe)  Confirm Follow Up Transport: Family  The Plan for Transition of Care is Related to the Following Treatment Goals : left sided weakness  The Patient and/or Patient Representative was Provided with a Choice of Provider and Agrees with the Discharge Plan?: Yes  Name of the Patient Representative Who was Provided with a Choice of Provider and Agrees with the Discharge Plan: [facility], spouse  Discharge Location  Patient Expects to be Discharged to[de-identified]  (TBD)

## 2022-04-27 NOTE — CONSULTS
NEUROLOGY CONSULTATION NOTE    Patient: Kaia Cooper MRN: [id number]  CSN: [id number]    YOB: 1980  Age: 43 y.o. Sex: female    DOA: 4/26/2022 LOS:  LOS: 1 day        Requesting Physician: Dr. Parag Godoy  Reason for Consultation: Left-sided pain and weakness. HISTORY OF PRESENT ILLNESS:   Kaia Cooper is a 43 y.o. female with history of appendectomy, who presented with left-sided chest, neck and shoulder pain, resulting in left arm weakness. The patient tells me that she has some visual changes in the past with double vision. She was seen in [facility]. The work-up was unclear. Yesterday, she woke up with pain on the left neck, which worse gradually. When she presented to ER, she was also complaining about left arm and leg pain and weakness. Currently, she denies remarkable weakness on lower extremities. She denies numbness. There is no speech or swallowing difficulties. She does have joint pains and muscle pains. She has headaches. When she presented to ER, she was seen by teleneurology. Head CT was unremarkable. CTA did not show any LVO. Troponins were elevated. I saw the patient in the MRI suite. Brain MRI does not show any acute infarcts. Work-up:  Brain MRI: Motion degraded study. 1.  No acute infarct, mass effect, or herniation. 2.  Reticular/linear susceptibility artifact in the lateral left frontal lobe with suggestion of low-level T2 hyperintensity and blush of enhancement. This finding is nonspecific but may represent a small vascular malformation such as developmental venous anomaly, perhaps with adjacent perivascular spaces. Hemosiderin staining from remote hemorrhage is also possible. MRI cervical spine: 1. Mildly motion degraded study. 2. Stranding of usual cervical lordosis without listhesis. 3. Cervical spondylosis at the C5-C6 level with left paracentral/subarticular disc protrusion.  Disc material noted in close proximal patient exiting nerve roots at this level. Neural foramina patent bilaterally. No spinal canal stenosis. 4. Mild disc bulge C6-C7 without evidence of significant spinal canal narrowing or neuroforaminal impingement. 5. Normal cord morphology. No abnormal internal cord signal or enhancement. MRI thoracic spine: 1. Normal thoracic cord morphology without evidence of abnormal internal cord signal or enhancement. 2. Maintained vertebral body heights and preserved intervertebral disc spaces without significant spinal canal stenosis or neuroforaminal impingement. 3. Partial visualization of lower cervical spondylosis, seen to better advantage on same-day/contemporaneously performed cervical spine MRI. CT Head: No acute intracranial pathology. CTA of head and neck: 1. No definite large vessel occlusion. No evidence of carotid or vertebral artery dissection. 2.  No hemodynamically significant ICA stenosis, based on NASCET criteria. 3. No high-grade vertebral artery stenosis. 4. No high-grade intracranial stenosis. 5. Borderline enlarged prepectoral nodes. Additional several scattered bilateral cervical chain nodes although not meeting size criteria for adenopathy. These are nonspecific and perhaps reactive. Does patient has adenopathy elsewhere?   Lipid panel:   Lab Results   Component Value Date/Time    Cholesterol, total 155 2022 02:05 AM    HDL Cholesterol 72 (H) 2022 02:05 AM    LDL, calculated 74.8 2022 02:05 AM    VLDL, calculated 8.2 2022 02:05 AM    Triglyceride 41 2022 02:05 AM    CHOL/HDL Ratio 2.2 2022 02:05 AM     HbA1c:   Lab Results   Component Value Date/Time    Hemoglobin A1c 5.2 2022 02:05 AM     PAST MEDICAL HISTORY:  Past Medical History:   Diagnosis Date    BMI 32.0-32.9,adult     Smoker 2022     PAST SURGICAL HISTORY:  Past Surgical History:   Procedure Laterality Date    HX APPENDECTOMY      HX GYN       x 3    HX ORTHOPAEDIC      knee     FAMILY HISTORY:  Family History   Problem Relation Age of Onset    Stroke Maternal Aunt      SOCIAL HISTORY:  Social History     Socioeconomic History    Marital status: SINGLE   Tobacco Use    Smoking status: Current Every Day Smoker     Packs/day: 0.50    Smokeless tobacco: Never Used   Substance and Sexual Activity    Alcohol use: Yes    Drug use: Never     MEDICATIONS:  Current Facility-Administered Medications   Medication Dose Route Frequency    aspirin chewable tablet 81 mg  81 mg Oral DAILY    lidocaine 4 % patch 2 Patch  2 Patch TransDERmal Q24H    oxyCODONE IR (ROXICODONE) tablet 10 mg  10 mg Oral Q6H PRN    morphine injection 2 mg  2 mg IntraVENous Q4H PRN    nitroglycerin (NITROBID) 2 % ointment 0.5 Inch  0.5 Inch Topical BID    sodium chloride (NS) flush 5-40 mL  5-40 mL IntraVENous Q8H    sodium chloride (NS) flush 5-40 mL  5-40 mL IntraVENous PRN    acetaminophen (TYLENOL) tablet 650 mg  650 mg Oral Q6H PRN    Or    acetaminophen (TYLENOL) suppository 650 mg  650 mg Rectal Q6H PRN    polyethylene glycol (MIRALAX) packet 17 g  17 g Oral DAILY PRN    ondansetron (ZOFRAN ODT) tablet 4 mg  4 mg Oral Q8H PRN    Or    ondansetron (ZOFRAN) injection 4 mg  4 mg IntraVENous Q6H PRN    enoxaparin (LOVENOX) injection 40 mg  40 mg SubCUTAneous DAILY    famotidine (PEPCID) tablet 20 mg  20 mg Oral BID     Prior to Admission medications    Medication Sig Start Date End Date Taking? Authorizing Provider   naproxen (NAPROSYN) 375 mg tablet Take 1 Tab by mouth two (2) times daily (with meals). 3/26/14   Kyler Sampson MD   HYDROcodone-acetaminophen [facility]) 5-325 mg per tablet Take 1 Tab by mouth every four (4) hours as needed for Pain. 3/26/14   Kyler Sampson MD       ALLERGIES:  No Known Allergies    Review of Systems  GENERAL: No fevers or chills. HEENT: No earache, tinnitus, sore throat or sinus congestion. Double vision during examination. NECK: No pain or stiffness.     CARDIOVASCULAR: No chest pain or pressure. No palpitations. PULMONARY: No shortness of breath, cough or wheeze. GASTROINTESTINAL: No abdominal pain, nausea, vomiting or diarrhea. GENITOURINARY: No urinary frequency or dysuria. MUSCULOSKELETAL: Neck pain and joint pains. Low back pain. DERMATOLOGIC: No rash, no itching, no lesions. ENDOCRINE: No heat or cold intolerance. HEMATOLOGICAL: No anemia or easy bruising or bleeding. NEUROLOGIC: See HPI. PHYSICAL EXAMINATION:     Visit Vitals  BP (!) 146/82   Pulse 72   Temp 97.5 °F (36.4 °C)   Resp 16   Ht 5' 2\" (1.575 m)   Wt 79.8 kg (176 lb)   SpO2 99%   Breastfeeding No   BMI 32.19 kg/m²      O2 Device: None (Room air)  GENERAL: Pleasant, in no apparent distress. HEENT: Moist mucous membranes, sclerae anicteric, scalp is atraumatic. CVS: Regular rate and rhythm, no murmurs or gallops. No carotid bruits. PULMONARY: Clear to auscultation bilaterally. No rales or rhonchi. No wheezing. EXTREMITIES: Normal range of motion at all sites. No deformities. ABDOMEN: Soft, nontender. SKIN: No rashes or ecchymoses. Warm and dry. NEUROLOGIC: Alert and oriented x3. Speech is fluent without any aphasia or dysarthria. Cranial nerves: Face is symmetric with symmetric smile. Facial sensation is intact. Extraocular movements are intact with no nystagmus. When I examine the patient, she squints her eyes. Visual fields are full to confrontation. PERRL. Tongue is midline. Palate elevates symmetrically. Hearing intact to speech. Sternocleidomastoid and trapezius strengths are full bilaterally. Motor: Effort related weakness on the left upper and lower extremities. Sensory: Left arm decreased to touch. Lower extremities are intact. Coordination: Intact coordination with finger-nose-finger bilaterally. Normal fine movements. No bradykinesia detected. Deep tendon reflexes: 2+ at biceps, brachioradialis, patella and ankles bilaterally. Toes are down-going bilaterally.   Gait assessment: Deferred. Labs: Results:       Chemistry Recent Labs     04/27/22 0205 04/26/22 1815   * 83    135*   K 3.9 4.2    105   CO2 26 29   BUN 7 10   CREA 0.69 1.00   CA 8.8 9.0   AGAP 5 1*   BUCR 10* 10*   AP 71 81   TP 7.2 7.3   ALB 3.9 4.0   GLOB 3.3 3.3   AGRAT 1.2 1.2      CBC w/Diff Recent Labs     04/27/22 0205 04/26/22 1815   WBC 6.3 6.2   RBC 4.35 4.53   HGB 10.5* 10.6*   HCT 35.0 35.8    315   GRANS  --  36*   LYMPH  --  51   EOS  --  3      Cardiac Enzymes No results for input(s): CPK, CKND1, PAUL in the last 72 hours. No lab exists for component: CKRMB, TROIP   Coagulation Recent Labs     04/26/22 1815   PTP 13.2   INR 1.1   APTT 23.8       Lipid Panel Lab Results   Component Value Date/Time    Cholesterol, total 155 04/27/2022 02:05 AM    HDL Cholesterol 72 (H) 04/27/2022 02:05 AM    LDL, calculated 74.8 04/27/2022 02:05 AM    VLDL, calculated 8.2 04/27/2022 02:05 AM    Triglyceride 41 04/27/2022 02:05 AM    CHOL/HDL Ratio 2.2 04/27/2022 02:05 AM      BNP No results for input(s): BNPP in the last 72 hours. Liver Enzymes Recent Labs     04/27/22 0205   TP 7.2   ALB 3.9   AP 71      Thyroid Studies Lab Results   Component Value Date/Time    TSH 3.06 04/27/2022 02:05 AM          Radiology:  MRI BRAIN W WO CONT    Result Date: 4/27/2022  EXAM: MRI BRAIN W/ CONTRAST INDICATION: Left-sided weakness COMPARISON: No prior study TECHNIQUE: Multiplanar multi sequence MR imaging of the brain was performed utilizing axial T2, FLAIR, diffusion, T2-weighted gradient echo, and multiplanar T1 pre and post contrast imaging with administration of gadolinium. _______________________ FINDINGS: Motion artifact is present which limits evaluation. VENTRICLES/EXTRA-AXIAL SPACES: The ventricles and sulci are normal in their size and configuration. BRAIN PARENCHYMA: No corpus callosal, brainstem, or cerebellar lesion is seen. No evidence of intracranial mass effect, midline shift, or herniation. No abnormal restricted diffusion to suggest acute infarct. Reticular and slightly linear susceptibility artifact is seen on gradient echo image 18 along the lateral aspect of the left frontal lobe cortex and subcortical white matter. Minimal T2 hyperintensity is present, perhaps with subtle blush of enhancement, not definitive. No surrounding edema or mass effect is present. VASCULATURE:  The major intracranial vascular flow voids are grossly normal. ORBITS: The visualized orbits are unremarkable. PARANASAL SINUSES: Visualized paranasal sinuses are clear. MASTOID AIR CELLS: Visualized mastoid air cells are clear. OSSEOUS STRUCTURES: Unremarkable OTHER: None.  ________________________     Motion degraded study. 1.  No acute infarct, mass effect, or herniation. 2.  Reticular/linear susceptibility artifact in the lateral left frontal lobe with suggestion of low-level T2 hyperintensity and blush of enhancement. This finding is nonspecific but may represent a small vascular malformation such as developmental venous anomaly, perhaps with adjacent perivascular spaces. Hemosiderin staining from remote hemorrhage is also possible. MRI CERV SPINE W WO CONT    Result Date: 4/27/2022  EXAM: MRI OF THE CERVICAL SPINE, with and without IV CONTRAST CLINICAL INDICATION/HISTORY: left sided weakness   > Additional: Neck pain. COMPARISON: CT angiogram of the head and neck 4/26/2022.   > Reference Exam: None. TECHNIQUE: T1 weighted sagittal and axial, STIR and T2 FSE sagittal, T2 FSE axial images obtained before the uneventful intravenous initiation of gadolinium-based contrast. Postcontrast imaging and axial and sagittal planes with fat suppression of sagittal images. _______________ FINDINGS:   > From an image quality perspective, detail this study is degraded secondary to motion artifact.  OSSEOUS, CERVICAL ALIGNMENT, CRANIOCERVICAL JUNCTION: There is straightening of usual cervical lordosis present without evidence of listhesis. Vertebral body heights are maintained. Mild intervertebral disc height loss and desiccation noted at the C5-C6 level. No suspicious marrow replacing lesion or process is demonstrated. No fracture. Vertebral body marrow signal intensity is normal. Atlanto-axial articulation is normal.  CERVICAL CORD, VISUALIZED POSTERIOR FOSSA: Visualized posterior fossa is unremarkable. No Chiari I malformation. Within the limitations of motion artifact, no abnormal internal cord signal or enhancement. Cord morphology appears within normal limits. PARASPINOUS SOFT TISSUES, VISUALIZED SOFT TISSUE NECK: Visualized soft tissues are unremarkable. C2-C3: No significant disc pathology. No spinal canal or neuroforaminal stenosis. C3-C4: No significant disc pathology. No spinal canal or neuroforaminal stenosis. C4-C5: Small central disc protrusion with slight ventral impression of the thecal sac. No spinal canal stenosis. Neural foramina are patent. C5-C6: Disc bulge with left paracentral subarticular disc protrusion. Abutment of the cervical cord is noted without evidence of spinal canal stenosis. Disc protrusion noted in close proximal patient to the exiting left-sided nerve roots at this level. Neural foramina are patent. C6-C7: Mild disc bulge. No spinal canal stenosis. No neuroforaminal encroachment C7-T1: No significant disc pathology. No spinal canal or neuroforaminal stenosis. _______________     1. Mildly motion degraded study. 2. Stranding of usual cervical lordosis without listhesis. 3. Cervical spondylosis at the C5-C6 level with left paracentral/subarticular disc protrusion. Disc material noted in close proximal patient exiting nerve roots at this level. Neural foramina patent bilaterally. No spinal canal stenosis. 4. Mild disc bulge C6-C7 without evidence of significant spinal canal narrowing or neuroforaminal impingement. 5. Normal cord morphology. No abnormal internal cord signal or enhancement.     MRI [facility] SPINE W WO CONT    Result Date: 4/27/2022  EXAM: MRI OF THE THORACIC SPINE, with and without IV CONTRAST CLINICAL INDICATION/HISTORY: left sided weakness   > Additional: Back pain, shortness of breath COMPARISON: None. > Reference Exam: None. TECHNIQUE: T1 weighted sagittal and axial, STIR and T2 FSE sagittal, T2 FSE axial before the uneventful intravenous administration of gadolinium-based contrast. Postcontrast imaging performed in axial and sagittal planes with fat suppression on sagittal images. _______________ FINDINGS:   > Similar to the cervical spine MR, detail of this examination is mildly degraded secondary to motion artifact. OSSEOUS STRUCTURES, ALIGNMENT: There is normal kyphotic curvature of the thoracic spine. No listhesis. Vertebral body and intervertebral disc space heights are maintained. No suspicious osseous lesion. No fracture. Vertebral body marrow signal intensity is normal. THORACIC CORD: Cord morphology and signal intensity are unremarkable throughout. Conus medullaris ends at the inferior T12 vertebral body level. PARASPINOUS SOFT TISSUES, VISUALIZED THORAX: Visualized soft tissues are unremarkable. Correlation of axial and sagittal images reveals the following: At all levels from T1-T2 through to T12-L1, there is no evidence for significant disc pathology or proliferative change. No central canal or foraminal stenosis. Partial inclusion of spondylosis at both C5-C6 and C6-C7 levels, seen to better advantage on field-of-view performed for cervical spine MRI. _______________     1. Normal thoracic cord morphology without evidence of abnormal internal cord signal or enhancement. 2. Maintained vertebral body heights and preserved intervertebral disc spaces without significant spinal canal stenosis or neuroforaminal impingement. 3. Partial visualization of lower cervical spondylosis, seen to better advantage on same-day/contemporaneously performed cervical spine MRI.     CT HEAD WO CONT    Result Date: 4/26/2022  EXAMINATION:  CT head noncontrast COMPARISON: None HISTORY: Stroke alert, history of stroke like symptoms. TECHNIQUE: Noncontrasted axial images were obtained through the patient's brain from the vertex of the skull through the skull base. Bone and soft tissue windows were reviewed. One or more dose reduction techniques were used on this CT: automated exposure control, adjustment of the mAs and/or kVp according to patient size, and iterative reconstruction techniques. The specific techniques used on this CT exam have been documented in the patient's electronic medical record. Digital Imaging and Communications in Medicine (DICOM) format image data are available to nonaffiliated external healthcare facilities or entities on a secure, media free, reciprocally searchable basis with patient authorization for at least a 12-month period after this study. [facility] FINDINGS: Brain architecture is normal. No mass effect, midline shift or hemorrhage. Ventricles are normal in size, position and configuration. No abnormal extra-axial fluid collections are seen. No territorial signs of infarct. The bony calvarium appears intact without acute displaced fracture. The visualized paranasal sinuses and mastoid air cells are aerated. 1. No acute intracranial pathology. Above code S stroke alert results relayed to patient's ER provider at 5:45 PM 4/26/2022. CTA HEAD NECK W WO CONT    Result Date: 4/27/2022  EXAM: CTA HEAD AND NECK INDICATION: Left neck pain and left arm weakness COMPARISON: No prior studies TECHNIQUE:  Multiple axial CT images of the neck were obtained extending from the level of the aortic arch to the skull base after the administration of the IV contrast utilizing a CTA protocol. Maximum intensity projection reconstructions were performed in multiple planes.  Multiple axial CT images of the head were obtained extending from below the level of the skull base to the vertex after the administration of the IV contrast utilizing a CTA protocol. Maximum intensity projection reconstructions were performed in three planes. Additional 3 D reconstructions were performed at a separate workstation. One or more dose reduction techniques were used on this CT: automated exposure control, adjustment of the mAs and/or kVp according to patient's size, and iterative reconstruction techniques. The specific techniques utilized on this CT exam have been documented in the patient's electronic medical record. Digital Imaging and Communications in Medicine (DICOM) format image data are available to nonaffiliated external healthcare facilities or entities on a secure, media free, reciprocally searchable basis with patient authorization for at least a 12-month period after this study. ___________________ FINDINGS: CTA NECK The origins of the great vessels along the aortic arch are unremarkable. The left common carotid is unremarkable. The left carotid bifurcation is unremarkable. Estimated minimal luminal diameter proximal left ICA 6.7 mm. Estimated luminal diameter of normal left ICA cervical segment is 4.1 mm. Estimated degree of stenosis of the left ICA based upon NASCET criteria is 0%. Remainder of left ICA cervical segment is unremarkable. No intimal flap is present to suggest dissection. The right common carotid is unremarkable. The right carotid bifurcation is unremarkable. Estimated minimal luminal diameter proximal right ICA 7.7 mm. Estimated luminal diameter of normal right ICA cervical segment is 4.1 mm. Estimated degree of stenosis of the right ICA based upon NASCET criteria is 0%. Remainder of right ICA cervical segment is unremarkable. No intimal flap is present to suggest dissection. The left vertebral artery is slightly larger than the right. No high grade vertebral artery stenosis is seen. No intimal flap is present to suggest vertebral artery dissection. Osseous structures are unremarkable. Visualized lung apices are clear. Several scattered bilateral cervical chain lymph nodes are present, not meeting size criteria for adenopathy. Additionally partially visualized subpectoral and axillary nodes are present, borderline enlarged. CTA HEAD There is no evidence of aneurysm. No focal carotid siphon stenosis is present. The carotid terminus is unremarkable. Right A1 segment is dominant. Left A1 segment is extremely hypoplastic/atretic. An anterior communicating artery is present with relatively equal sized A2 segments. The middle cerebral artery bifurcations are unremarkable. The vertebral arteries are both relatively small in size. PICA origins are unremarkable. Basilar artery is overall small in size without superimposed stenosis. Posterior cerebral arteries are unremarkable. A small posterior communicating artery is present on the right with infundibular type origin. The dural venous sinuses are patent. ___________________     1. No definite large vessel occlusion. No evidence of carotid or vertebral artery dissection. 2.  No hemodynamically significant ICA stenosis, based on NASCET criteria. 3. No high-grade vertebral artery stenosis. 4. No high-grade intracranial stenosis. 5. Borderline enlarged prepectoral nodes. Additional several scattered bilateral cervical chain nodes although not meeting size criteria for adenopathy. These are nonspecific and perhaps reactive. Does patient has adenopathy elsewhere? Preliminary report of this study was provided by [facility]. XR CHEST PORT    Result Date: 4/27/2022  EXAM: XR CHEST PORT CLINICAL INDICATION/HISTORY: sob -Additional: None COMPARISON: None TECHNIQUE: Frontal view of the chest _______________ FINDINGS: HEART AND MEDIASTINUM: Normal cardiac size and mediastinal contours. LUNGS AND PLEURAL SPACES: No focal pneumonic consolidation, pneumothorax, or pleural effusion.  BONY THORAX AND SOFT TISSUES: No acute osseous abnormality _______________     No acute findings in the chest.     CTA CHEST ABD PELV W CONT    Result Date: 4/26/2022  EXAM: CT angiogram of the chest, abdomen and pelvis INDICATION: Chest pain, concern for dissection COMPARISON: None. Technique: CT arteriogram of the chest abdomen pelvis was performed as a volume acquisition after intravenous contrast without complication. Coronal and sagittal MIP reconstructions were performed in order to create angiographic images of the arterial vasculature and to increase accuracy for detection of vascular abnormality. One or more dose reduction techniques were used on this CT: automated exposure control, adjustment of the mAs and/or kVp according to patient size, and iterative reconstruction techniques. The specific techniques used on this CT exam have been documented in the patient's electronic medical record. Digital Imaging and Communications in Medicine (DICOM) format image data are available to nonaffiliated external healthcare facilities or entities on a secure, media free, reciprocally searchable basis with patient authorization for at least a 12-month period after this study. [facility] FINDINGS VASCULATURE: Thoracic and abdominal aorta nonaneurysmal. No dissection or acute abnormality. Visualized great vessels are widely patent. Visualized pulmonary arteries widely patent. Abdominal visceral and mesenteric vasculature widely patent. Iliac arterial vasculature widely patent. NONVASCULAR FINDINGS: CHEST: Unremarkable. MEDIASTINUM: Heart size normal. No pericardial effusion. LIVER, BILIARY: Liver is normal. No biliary dilation. Gallbladder is unremarkable. PANCREAS: Normal. SPLEEN: Normal. ADRENALS: Normal. KIDNEYS: Normal. LYMPH NODES: Right axillary/prepectoral lymph node measuring 1.6 cm. GASTROINTESTINAL TRACT: No bowel dilation or wall thickening. PELVIC ORGANS: Simple appearing right ovarian cyst measuring 3.7 cm. . BONES: No acute or aggressive osseous abnormalities identified. OTHER: None. _______________     No acute vascular or nonvascular abnormality identified. Mildly enlarged right axillary/prepectoral lymph node measuring 1.6 cm is nonspecific, likely reactive in nature. Recommend clinical follow-up. No other suspicious adenopathy or lesion identified. ASSESSMENT/IMPRESSION:  Chest pain, neck pain and left-sided weakness. I do not think the symptoms and findings are neurological in nature. Her troponins are elevated, which may be nonspecific or cardiogenic. The patient needs to be seen and followed by cardiology. I am not sure if the symptoms can be explained with a cardiac pathology. Brain MRI does not show any stroke. C-spine MRI shows nonspecific degenerative changes. They do not explain the current clinical symptoms. There may be an underlying functional neurological disorder. RECOMMENDATIONS:  1. Consider cardiology consult. Please discontinue stroke pathway. 2.  No further recommendations from neurology in terms of work-up.   3.   PT/OT and disposition planning    Please do not hesitate to return with any questions.    ------------------------------------  Holden Monroy MD  4/27/2022  3:43 PM

## 2022-04-27 NOTE — ED NOTES
Patient c/o continued left sided neck pain despite morphine administration. Patient requesting neck brace due to pain. Dr. Rosemary Chowdary made aware.

## 2022-04-27 NOTE — PROGRESS NOTES
Hospitalist Progress Note    Patient: Jordon Lindsay MRN: [id number]  CSN: [id number]    YOB: 1980  Age: 43 y.o. Sex: female    DOA: 4/26/2022 LOS:  LOS: 1 day          Chief Complaint:    Chest pain      Assessment/Plan        77-year-old female who is a smoker with obesity is admitted for left neck pain and arm weakness with elevated troponin and chest pain.     Elevated troponin with chest pain    Echo result pending  troponins going down to normal  No chest pain    Cardiology consult     Left-sided neck pain and arm weakness  MRI of the cervical and thoracic spine done-C5-C7 may have some disc disease  Seems musculoskeletal and may benefit from steroid trial    Blurred vision with dysconjugate gaze  TSH and prolactin levels ordered  Brain MRI result pending  Neurology consult     Smoker-marijuana and cigarette  Smoking cessation recommended     Lymph node enlargement- isolated without worrisome findings on CT  Outpatient follow-up with primary care provider          Disposition :  Patient Active Problem List   Diagnosis Code    BMI 32.0-32.9,adult Z68.32    Elevated troponin R77.8    Smoker F17.200    Lymph node enlargement R59.9    Neck pain on left side M54.2    Chest pain R07.9    Left arm weakness R29.898    Blurred vision, bilateral H53.8    Dysconjugate gaze H51.8    Marijuana use F12.90       Subjective:  Pain left neck, and radiates to back of left shoulder  No chest pains      Review of systems:    Constitutional: denies fevers, chills  Respiratory: denies SOB  Cardiovascular: denies chest pain, palpitations  Gastrointestinal: denies nausea, vomiting, diarrhea      Vital signs/Intake and Output:  Visit Vitals  BP (!) 146/82   Pulse 72   Temp 97.5 °F (36.4 °C)   Resp 16   Ht 5' 2\" (1.575 m)   Wt 79.8 kg (176 lb)   SpO2 99%   Breastfeeding No   BMI 32.19 kg/m²     Current Shift:  No intake/output data recorded.   Last three shifts:  04/25 1901 - 04/27 0700  In: 48 [I.V.:50]  Out: -     Exam:    General: Well developed, alert, NAD, OX3  CVS:Regular rate and rhythm, no M/R/G, S1/S2 heard, no thrill  Lungs:Clear to auscultation bilaterally, no wheezes, rhonchi, or rales  Abdomen: Soft, Nontender, No distention, Normal Bowel sound  Extremities: No C/C/E, pulses palpable 2+  Neuro:grossly normal , follows commands  Psych:appropriate                Labs: Results:       Chemistry Recent Labs     04/27/22 0205 04/26/22 1815   * 83    135*   K 3.9 4.2    105   CO2 26 29   BUN 7 10   CREA 0.69 1.00   CA 8.8 9.0   AGAP 5 1*   BUCR 10* 10*   AP 71 81   TP 7.2 7.3   ALB 3.9 4.0   GLOB 3.3 3.3   AGRAT 1.2 1.2      CBC w/Diff Recent Labs     04/27/22 0205 04/26/22 1815   WBC 6.3 6.2   RBC 4.35 4.53   HGB 10.5* 10.6*   HCT 35.0 35.8    315   GRANS  --  36*   LYMPH  --  51   EOS  --  3      Cardiac Enzymes No results for input(s): CPK, CKND1, PAUL in the last 72 hours. No lab exists for component: CKRMB, TROIP   Coagulation Recent Labs     04/26/22 1815   PTP 13.2   INR 1.1   APTT 23.8       Lipid Panel No results found for: CHOL, CHOLPOCT, CHOLX, CHLST, CHOLV, 882575, HDL, HDLP, LDL, LDLC, DLDLP, 751949, VLDLC, VLDL, TGLX, TRIGL, TRIGP, TGLPOCT, CHHD, CHHDX   BNP No results for input(s): BNPP in the last 72 hours.    Liver Enzymes Recent Labs     04/27/22 0205   TP 7.2   ALB 3.9   AP 71      Thyroid Studies Lab Results   Component Value Date/Time    TSH 3.06 04/27/2022 02:05 AM        Procedures/imaging: see electronic medical records for all procedures/Xrays and details which were not copied into this note but were reviewed prior to creation of Dre Hawley MD

## 2022-04-27 NOTE — PROGRESS NOTES
Received call from MRI stating Patient is requesting \"something\" for anxiety due to clastraphobic feelings. Dr Anton Kim paged. New order for 1 mg ativan IV to be given once.

## 2022-04-27 NOTE — PROGRESS NOTES
conducted an initial consultation and Spiritual Assessment for Allyson Muñoz, who is a 43 y.o.,female. Patients Primary Language is: [place]. According to the patients EMR Spiritism Affiliation is: Evelyne Carrillo. The reason the Patient came to the hospital is:   Patient Active Problem List    Diagnosis Date Noted    BMI 32.0-32.9,adult 04/27/2022    Elevated troponin 04/27/2022    Smoker 04/27/2022    Lymph node enlargement 04/27/2022    Neck pain on left side 04/27/2022    Chest pain 04/27/2022    Left arm weakness 04/27/2022    Blurred vision, bilateral 04/27/2022    Dysconjugate gaze 04/27/2022    Marijuana use 04/27/2022        The  provided the following Interventions:  Initiated a relationship of care and support with patient and family present. Listened empathically. Provided information about Spiritual Care Services. Offered assurance of prayer on patient's behalf. Chart reviewed. The following outcomes where achieved:   confirmed Patient's Spiritism Affiliation. Provided devotional material and spiritual Care greeting card. Patient expressed gratitude for 's visit. Assessment:  Patient does not have any Congregational/cultural needs that will affect patients preferences in health care. Plan:  Chaplains will continue to follow and will provide pastoral care on an as needed/requested basis.  recommends bedside caregivers page  on duty if patient shows signs of acute spiritual or emotional distress. Rev.  Susanna Lamb, Certified Respecting [address] [place] [address]  [phone]

## 2022-04-27 NOTE — H&P
History & Physical    Patient: Rhianna Jaeger MRN: [id number]  CSN: [id number]    YOB: 1980  Age: 43 y.o. Sex: female      DOA: 4/26/2022  Primary Care Provider:  None      Assessment/Plan     Patient Active Problem List   Diagnosis Code    BMI 32.0-32.9,adult Z68.32    Elevated troponin R77.8    Smoker F17.200    Lymph node enlargement R59.9    Neck pain on left side M54.2    Chest pain R07.9    Left arm weakness R29.898    Blurred vision, bilateral H53.8    Dysconjugate gaze H51.8    Marijuana use      51-year-old female who is a smoker with obesity is admitted for left neck pain and arm weakness with elevated troponin and chest pain.     Elevated troponin with chest pain  -Telemetry  -Trend troponins  -Nitropaste twice daily  -Start aspirin  -Cardiology consult  -Echocardiogram  -Hold off on heparin as troponin is trending down  --Will likely need stress test prior to discharge    Left-sided neck pain and arm weakness  -MRI of the cervical and thoracic spine ordered  -Lidoderm patches  -Morphine as needed    Blurred vision with dysconjugate gaze  -TSH and prolactin levels ordered  -Brain MRI ordered  -Neurology consult    Smoker-marijuana and cigarette  -Smoking cessation recommended    Lymph node enlargement- isolated without worrisome findings on CT  - Outpatient follow-up with primary care provider    BMI 32-lifestyle modification needed  - Follow-up hemoglobin A1c and lipid panel    Full code    Prophylaxis: pepcid PO, lovenox SQ    Estimated length of stay : 3 nights    Kadeem Hill MD  Nocturnist  ----------------------------------------------------------------------------------------------------------------------------------------------------------------------------------------------------------------  CC: Left neck pain and chest pain       HPI:     Rhianna Jaeger is a 43 y.o. female who is a smoker with obesity presents to the ED for severe left-sided neck pain with headache beginning this morning when she woke up. She developed chest pain and shortness of breath a short time later along with left arm numbness and tingling and weakness. She has had episodes of neck pain in the past and sometimes it is difficult for her to turn her head, always to the left side. She has had some traumatic injuries in the remote past but has not had a cervical spine MRI. Additionally, she has noted blurry and double vision that has been worsening over the past several months. She saw an ophthalmologist in [facility] who did not find anything abnormal on exam. She has not had a brain MRI before. She has also seen a neurologist for headaches. The chest pain is new today as she has never had this before. I had requested Nitropaste and once this was applied she noted a reduction in her left arm pain and chest pressure, but the neck pain was still there. She denies any history of blood clots or heart problems. She denies any fever, nausea, vomiting, or diarrhea. Past Medical History:   Diagnosis Date    BMI 32.0-32.9,adult     Smoker 2022       Past Surgical History:   Procedure Laterality Date    HX APPENDECTOMY      HX GYN       x 3    HX ORTHOPAEDIC      knee       Family History   Problem Relation Age of Onset    Stroke Maternal Aunt        Social History     Socioeconomic History    Marital status: SINGLE   Tobacco Use    Smoking status: Current Every Day Smoker     Packs/day: 0.50    Smokeless tobacco: Never Used   Substance and Sexual Activity    Alcohol use: Yes    Drug use: Never       Prior to Admission medications    Medication Sig Start Date End Date Taking? Authorizing Provider   naproxen (NAPROSYN) 375 mg tablet Take 1 Tab by mouth two (2) times daily (with meals). 3/26/14   Brittni Velasco MD   HYDROcodone-acetaminophen [facility]) 5-325 mg per tablet Take 1 Tab by mouth every four (4) hours as needed for Pain.  3/26/14   Brittni Velasco MD No Known Allergies    Review of Systems  Gen: No fever, chills, malaise, weight loss/gain. Heent: +headache, left neck pain, blurry vision. Heart: +chest pain, palpitations, no PRAJAPATI, pnd, or orthopnea. Resp: No cough, hemoptysis, wheezing and +shortness of breath. GI: No nausea, vomiting, diarrhea, constipation. : No urinary obstruction, dysuria or hematuria. Derm: No rash, new skin lesion or pruritis. Musc/skeletal: left sided neck pain radiating to arm. Vasc: No edema, cyanosis or claudication. Endo: No heat/cold intolerance, no polyuria,polydipsia or polyphagia. Neuro: +unilateral weakness, numbness, tingling in left arm. No seizures. Heme: No easy bruising or bleeding. Physical Exam:     Physical Exam:  Visit Vitals  /86   Pulse 78   Temp 98.2 °F (36.8 °C)   Resp 16   Ht 5' 2\" (1.575 m)   Wt 79.8 kg (176 lb)   SpO2 100%   Breastfeeding No   BMI 32.19 kg/m²      O2 Device: None (Room air)    Temp (24hrs), Av.9 °F (36.6 °C), Min:97.3 °F (36.3 °C), Max:98.2 °F (36.8 °C)     1901 -  0700  In: 50 [I.V.:50]  Out: -    No intake/output data recorded. General:  Awake, cooperative, no distress. Head:  Normocephalic, without obvious abnormality, atraumatic. Eyes:  Conjunctivae/corneas clear, sclera anicteric, PERRL, dysconjugate lateral gaze. Neck: Unable to move her neck laterally to the left more than 5 degrees due to pain, as compared to 45 degrees on the right side. Lungs:   Clear to auscultation bilaterally. Heart:  Regular rate and rhythm, S1, S2 normal, no murmur, click, rub or gallop. Abdomen: Soft, non-tender. Bowel sounds normal. No masses,  No organomegaly. Extremities: Extremities normal, atraumatic, no cyanosis or edema. Capillary refill normal.   Pulses: 2+ and symmetric all extremities.    Skin: Skin color pink, turgor normal. No rashes or lesions   Neurologic: CNII-XII intact except for dysconjugate gaze bilaterally as noted above with lateral gaze deviation. 4/5 strength in left upper extremity as compared to right side. Increased heaviness in arm and sensory change as well. 5/5 strength symmetric bilaterally in the lower extremities. Labs Reviewed: All lab results for the last 24 hours reviewed. Recent Results (from the past 24 hour(s))   GLUCOSE, POC    Collection Time: 04/26/22  5:29 PM   Result Value Ref Range    Glucose (POC) 99 70 - 110 mg/dL   CBC WITH AUTOMATED DIFF    Collection Time: 04/26/22  6:15 PM   Result Value Ref Range    WBC 6.2 4.6 - 13.2 K/uL    RBC 4.53 4.20 - 5.30 M/uL    HGB 10.6 (L) 12.0 - 16.0 g/dL    HCT 35.8 35.0 - 45.0 %    MCV 79.0 78.0 - 100.0 FL    MCH 23.4 (L) 24.0 - 34.0 PG    MCHC 29.6 (L) 31.0 - 37.0 g/dL    RDW 16.9 (H) 11.6 - 14.5 %    PLATELET 057 115 - 790 K/uL    MPV 9.8 9.2 - 11.8 FL    NRBC 0.0 0  WBC    ABSOLUTE NRBC 0.00 0.00 - 0.01 K/uL    NEUTROPHILS 36 (L) 40 - 73 %    LYMPHOCYTES 51 21 - 52 %    MONOCYTES 9 3 - 10 %    EOSINOPHILS 3 0 - 5 %    BASOPHILS 1 0 - 2 %    IMMATURE GRANULOCYTES 0 0.0 - 0.5 %    ABS. NEUTROPHILS 2.3 1.8 - 8.0 K/UL    ABS. LYMPHOCYTES 3.2 0.9 - 3.6 K/UL    ABS. MONOCYTES 0.6 0.05 - 1.2 K/UL    ABS. EOSINOPHILS 0.2 0.0 - 0.4 K/UL    ABS. BASOPHILS 0.0 0.0 - 0.1 K/UL    ABS. IMM.  GRANS. 0.0 0.00 - 0.04 K/UL    DF AUTOMATED     METABOLIC PANEL, BASIC    Collection Time: 04/26/22  6:15 PM   Result Value Ref Range    Sodium 135 (L) 136 - 145 mmol/L    Potassium 4.2 3.5 - 5.5 mmol/L    Chloride 105 100 - 111 mmol/L    CO2 29 21 - 32 mmol/L    Anion gap 1 (L) 3.0 - 18 mmol/L    Glucose 83 74 - 99 mg/dL    BUN 10 7.0 - 18 MG/DL    Creatinine 1.00 0.6 - 1.3 MG/DL    BUN/Creatinine ratio 10 (L) 12 - 20      GFR est AA >60 >60 ml/min/1.73m2    GFR est non-AA >60 >60 ml/min/1.73m2    Calcium 9.0 8.5 - 10.1 MG/DL   PROTHROMBIN TIME + INR    Collection Time: 04/26/22  6:15 PM   Result Value Ref Range    Prothrombin time 13.2 11.5 - 15.2 sec    INR 1.1 0.8 - 1.2 TROPONIN-HIGH SENSITIVITY    Collection Time: 04/26/22  6:15 PM   Result Value Ref Range    Troponin-High Sensitivity 107 (HH) 0 - 54 ng/L   HCG QL SERUM    Collection Time: 04/26/22  6:15 PM   Result Value Ref Range    HCG, Ql. Negative NEG     PTT    Collection Time: 04/26/22  6:15 PM   Result Value Ref Range    aPTT 23.8 23.0 - 36.4 SEC   HEPATIC FUNCTION PANEL    Collection Time: 04/26/22  6:15 PM   Result Value Ref Range    Protein, total 7.3 6.4 - 8.2 g/dL    Albumin 4.0 3.4 - 5.0 g/dL    Globulin 3.3 2.0 - 4.0 g/dL    A-G Ratio 1.2 0.8 - 1.7      Bilirubin, total 0.3 0.2 - 1.0 MG/DL    Bilirubin, direct 0.1 0.0 - 0.2 MG/DL    Alk.  phosphatase 81 45 - 117 U/L    AST (SGOT) 21 10 - 38 U/L    ALT (SGPT) 18 13 - 56 U/L   EKG, 12 LEAD, INITIAL    Collection Time: 04/26/22  6:15 PM   Result Value Ref Range    Ventricular Rate 93 BPM    Atrial Rate 93 BPM    P-R Interval 198 ms    QRS Duration 76 ms    Q-T Interval 350 ms    QTC Calculation (Bezet) 435 ms    Calculated P Axis 68 degrees    Calculated R Axis 43 degrees    Calculated T Axis 46 degrees    Diagnosis       Poor data quality, interpretation may be adversely affected  Normal sinus rhythm  Possible Left atrial enlargement  Septal infarct , age undetermined  Abnormal ECG  Confirmed by Zeferino Meza MD, Simeon Encarnacion ([id number]) on 4/26/2022 10:09:22 PM     TROPONIN-HIGH SENSITIVITY    Collection Time: 04/26/22  7:38 PM   Result Value Ref Range    Troponin-High Sensitivity 93 (HH) 0 - 54 ng/L   EKG, 12 LEAD, INITIAL    Collection Time: 04/26/22  7:49 PM   Result Value Ref Range    Ventricular Rate 89 BPM    Atrial Rate 89 BPM    P-R Interval 188 ms    QRS Duration 66 ms    Q-T Interval 372 ms    QTC Calculation (Bezet) 452 ms    Calculated P Axis 58 degrees    Calculated R Axis 25 degrees    Calculated T Axis 25 degrees    Diagnosis       Poor data quality, interpretation may be adversely affected  Normal sinus rhythm  Possible Anteroseptal infarct , age undetermined  Abnormal ECG  Confirmed by Rao Dobbs MD, Quincy Estrada ([id number]) on 4/26/2022 10:10:22 PM     URINALYSIS W/ RFLX MICROSCOPIC    Collection Time: 04/26/22  8:15 PM   Result Value Ref Range    Color YELLOW      Appearance CLEAR      Specific gravity >1.030 (H) 1.005 - 1.030    pH (UA) 7.0 5.0 - 8.0      Protein Negative NEG mg/dL    Glucose Negative NEG mg/dL    Ketone Negative NEG mg/dL    Bilirubin Negative NEG      Blood Negative NEG      Urobilinogen 0.2 0.2 - 1.0 EU/dL    Nitrites Negative NEG      Leukocyte Esterase Negative NEG     DRUG SCREEN, URINE    Collection Time: 04/26/22  9:30 PM   Result Value Ref Range    BENZODIAZEPINES Negative NEG      BARBITURATES Negative NEG      THC (TH-CANNABINOL) Positive (A) NEG      OPIATES Positive (A) NEG      PCP(PHENCYCLIDINE) Negative NEG      COCAINE Negative NEG      AMPHETAMINES Negative NEG      METHADONE Negative NEG      HDSCOM (NOTE)    METABOLIC PANEL, COMPREHENSIVE    Collection Time: 04/27/22  2:05 AM   Result Value Ref Range    Sodium 137 136 - 145 mmol/L    Potassium 3.9 3.5 - 5.5 mmol/L    Chloride 106 100 - 111 mmol/L    CO2 26 21 - 32 mmol/L    Anion gap 5 3.0 - 18 mmol/L    Glucose 132 (H) 74 - 99 mg/dL    BUN 7 7.0 - 18 MG/DL    Creatinine 0.69 0.6 - 1.3 MG/DL    BUN/Creatinine ratio 10 (L) 12 - 20      GFR est AA >60 >60 ml/min/1.73m2    GFR est non-AA >60 >60 ml/min/1.73m2    Calcium 8.8 8.5 - 10.1 MG/DL    Bilirubin, total 0.3 0.2 - 1.0 MG/DL    ALT (SGPT) 19 13 - 56 U/L    AST (SGOT) 20 10 - 38 U/L    Alk.  phosphatase 71 45 - 117 U/L    Protein, total 7.2 6.4 - 8.2 g/dL    Albumin 3.9 3.4 - 5.0 g/dL    Globulin 3.3 2.0 - 4.0 g/dL    A-G Ratio 1.2 0.8 - 1.7     CBC W/O DIFF    Collection Time: 04/27/22  2:05 AM   Result Value Ref Range    WBC 6.3 4.6 - 13.2 K/uL    RBC 4.35 4.20 - 5.30 M/uL    HGB 10.5 (L) 12.0 - 16.0 g/dL    HCT 35.0 35.0 - 45.0 %    MCV 80.5 78.0 - 100.0 FL    MCH 24.1 24.0 - 34.0 PG    MCHC 30.0 (L) 31.0 - 37.0 g/dL    RDW 16.7 (H) 11.6 - 14.5 % PLATELET 548 406 - 005 K/uL    MPV 10.0 9.2 - 11.8 FL    NRBC 0.0 0  WBC    ABSOLUTE NRBC 0.00 0.00 - 0.01 K/uL   TROPONIN-HIGH SENSITIVITY    Collection Time: 04/27/22  2:05 AM   Result Value Ref Range    Troponin-High Sensitivity 86 (HH) 0 - 54 ng/L     Results  CT HEAD WO CONT (Accession [id number]) (Order [id number])    Allergies       No Known Allergies     Exam Information    Status Exam Begun  Exam Ended    Final [99] 4/26/2022 17:35 4/26/2022  5:37 PM [id number]  5:37 PM     Result Information    Status: Final result (Exam End: 4/26/2022 17:37) Provider Status: Open       CT HEAD WO CONT: Patient Communication     Released  Not seen     Study Result    Narrative & Impression   EXAMINATION:  CT head noncontrast     COMPARISON: None     HISTORY: Stroke alert, history of stroke like symptoms.     TECHNIQUE: Noncontrasted axial images were obtained through the patient's brain  from the vertex of the skull through the skull base. Bone and soft tissue  windows were reviewed. One or more dose reduction techniques were used on this  CT: automated exposure control, adjustment of the mAs and/or kVp according to  patient size, and iterative reconstruction techniques. The specific techniques  used on this CT exam have been documented in the patient's electronic medical  record. Digital Imaging and Communications in Medicine (DICOM) format image  data are available to nonaffiliated external healthcare facilities or entities  on a secure, media free, reciprocally searchable basis with patient  authorization for at least a 12-month period after this study. .     FINDINGS: Brain architecture is normal. No mass effect, midline shift or  hemorrhage. Ventricles are normal in size, position and configuration. No  abnormal extra-axial fluid collections are seen. No territorial signs of  infarct. The bony calvarium appears intact without acute displaced fracture.  The  visualized paranasal sinuses and mastoid air cells are aerated.     IMPRESSION     1. No acute intracranial pathology.     Above code S stroke alert results relayed to patient's ER provider at 5:45 PM  4/26/2022. Results  CTA CHEST ABD PELV W CONT (Accession [id number]) (Order [id number])    Allergies       No Known Allergies     Exam Information    Status Exam Begun  Exam Ended    Final [99] 4/26/2022 20:18 4/26/2022  8:39 PM [id number]  8:39 PM     Result Information    Status: Final result (Exam End: 4/26/2022 20:39) Provider Status: Open       CTA CHEST ABD PELV W CONT: Patient Communication     Released  Not seen     Study Result    Narrative & Impression   EXAM: CT angiogram of the chest, abdomen and pelvis     INDICATION: Chest pain, concern for dissection     COMPARISON: None.        Technique: CT arteriogram of the chest abdomen pelvis was performed as a volume  acquisition after intravenous contrast without complication. Coronal and  sagittal MIP reconstructions were performed in order to create angiographic  images of the arterial vasculature and to increase accuracy for detection of  vascular abnormality. One or more dose reduction techniques were used on this  CT: automated exposure control, adjustment of the mAs and/or kVp according to  patient size, and iterative reconstruction techniques. The specific techniques  used on this CT exam have been documented in the patient's electronic medical  record. Digital Imaging and Communications in Medicine (DICOM) format image  data are available to nonaffiliated external healthcare facilities or entities  on a secure, media free, reciprocally searchable basis with patient  authorization for at least a 12-month period after this study. .     FINDINGS     VASCULATURE: Thoracic and abdominal aorta nonaneurysmal. No dissection or acute  abnormality. Visualized great vessels are widely patent. Visualized pulmonary  arteries widely patent. Abdominal visceral and mesenteric vasculature widely  patent.  Iliac arterial vasculature widely patent.        NONVASCULAR FINDINGS:     CHEST: Unremarkable.     MEDIASTINUM: Heart size normal. No pericardial effusion.     LIVER, BILIARY: Liver is normal. No biliary dilation. Gallbladder is  unremarkable.     PANCREAS: Normal.     SPLEEN: Normal.     ADRENALS: Normal.     KIDNEYS: Normal.     LYMPH NODES: Right axillary/prepectoral lymph node measuring 1.6 cm.     GASTROINTESTINAL TRACT: No bowel dilation or wall thickening.     PELVIC ORGANS: Simple appearing right ovarian cyst measuring 3.7 cm. .     BONES: No acute or aggressive osseous abnormalities identified.     OTHER: None.     _______________     IMPRESSION     No acute vascular or nonvascular abnormality identified.     Mildly enlarged right axillary/prepectoral lymph node measuring 1.6 cm is  nonspecific, likely reactive in nature. Recommend clinical follow-up. No other  suspicious adenopathy or lesion identified. CTA head and neck with contrast  1. No dissection, hemodynamically significant stenosis, or occlusion. 2.  Patent intracranial circulation.     CXR pending

## 2022-04-27 NOTE — PROGRESS NOTES
Problem: Falls - Risk of  Goal: *Absence of Falls  Description: Document Coco Mandes Fall Risk and appropriate interventions in the flowsheet.   Outcome: Progressing Towards Goal  Note: Fall Risk Interventions:                                Problem: Patient Education: Go to Patient Education Activity  Goal: Patient/Family Education  Outcome: Progressing Towards Goal

## 2022-04-27 NOTE — ED NOTES
Patient ambulated to bathroom. Patient requesting to have purewick placed due to worsened pain with movement.

## 2022-04-28 ENCOUNTER — APPOINTMENT (OUTPATIENT)
Dept: NON INVASIVE DIAGNOSTICS | Age: 42
End: 2022-04-28
Attending: INTERNAL MEDICINE
Payer: MEDICAID

## 2022-04-28 ENCOUNTER — APPOINTMENT (OUTPATIENT)
Dept: NUCLEAR MEDICINE | Age: 42
End: 2022-04-28
Attending: INTERNAL MEDICINE
Payer: MEDICAID

## 2022-04-28 VITALS
WEIGHT: 176 LBS | BODY MASS INDEX: 32.39 KG/M2 | DIASTOLIC BLOOD PRESSURE: 88 MMHG | TEMPERATURE: 98.6 F | OXYGEN SATURATION: 100 % | HEART RATE: 93 BPM | SYSTOLIC BLOOD PRESSURE: 138 MMHG | HEIGHT: 62 IN | RESPIRATION RATE: 18 BRPM

## 2022-04-28 LAB
ALBUMIN SERPL-MCNC: 3.5 G/DL (ref 3.4–5)
ALBUMIN/GLOB SERPL: 1.2 {RATIO} (ref 0.8–1.7)
ALP SERPL-CCNC: 68 U/L (ref 45–117)
ALT SERPL-CCNC: 17 U/L (ref 13–56)
ANION GAP SERPL CALC-SCNC: 4 MMOL/L (ref 3–18)
AST SERPL-CCNC: 16 U/L (ref 10–38)
BILIRUB SERPL-MCNC: 0.2 MG/DL (ref 0.2–1)
BUN SERPL-MCNC: 8 MG/DL (ref 7–18)
BUN/CREAT SERPL: 11 (ref 12–20)
CALCIUM SERPL-MCNC: 9.1 MG/DL (ref 8.5–10.1)
CHLORIDE SERPL-SCNC: 105 MMOL/L (ref 100–111)
CO2 SERPL-SCNC: 28 MMOL/L (ref 21–32)
CREAT SERPL-MCNC: 0.76 MG/DL (ref 0.6–1.3)
ERYTHROCYTE [DISTWIDTH] IN BLOOD BY AUTOMATED COUNT: 16.9 % (ref 11.6–14.5)
GLOBULIN SER CALC-MCNC: 3 G/DL (ref 2–4)
GLUCOSE SERPL-MCNC: 92 MG/DL (ref 74–99)
HCT VFR BLD AUTO: 32.2 % (ref 35–45)
HGB BLD-MCNC: 9.6 G/DL (ref 12–16)
MCH RBC QN AUTO: 23.6 PG (ref 24–34)
MCHC RBC AUTO-ENTMCNC: 29.8 G/DL (ref 31–37)
MCV RBC AUTO: 79.3 FL (ref 78–100)
NRBC # BLD: 0 K/UL (ref 0–0.01)
NRBC BLD-RTO: 0 PER 100 WBC
NUC STRESS EJECTION FRACTION: 81 %
PLATELET # BLD AUTO: 280 K/UL (ref 135–420)
PMV BLD AUTO: 10 FL (ref 9.2–11.8)
POTASSIUM SERPL-SCNC: 4 MMOL/L (ref 3.5–5.5)
PROT SERPL-MCNC: 6.5 G/DL (ref 6.4–8.2)
RBC # BLD AUTO: 4.06 M/UL (ref 4.2–5.3)
SODIUM SERPL-SCNC: 137 MMOL/L (ref 136–145)
STRESS BASELINE HR: 81 BPM
STRESS BASELINE ST DEPRESSION: 0 MM
STRESS ESTIMATED WORKLOAD: 1 METS
STRESS EXERCISE DUR MIN: 0 MIN
STRESS EXERCISE DUR SEC: 56 SEC
STRESS PEAK DIAS BP: 98 MMHG
STRESS PEAK SYS BP: 148 MMHG
STRESS PERCENT HR ACHIEVED: 66 %
STRESS POST PEAK HR: 118 BPM
STRESS RATE PRESSURE PRODUCT: NORMAL BPM*MMHG
STRESS TARGET HR: 178 BPM
WBC # BLD AUTO: 6.7 K/UL (ref 4.6–13.2)

## 2022-04-28 PROCEDURE — 80053 COMPREHEN METABOLIC PANEL: CPT

## 2022-04-28 PROCEDURE — 93017 CV STRESS TEST TRACING ONLY: CPT

## 2022-04-28 PROCEDURE — 36415 COLL VENOUS BLD VENIPUNCTURE: CPT

## 2022-04-28 PROCEDURE — A9500 TC99M SESTAMIBI: HCPCS

## 2022-04-28 PROCEDURE — 85027 COMPLETE CBC AUTOMATED: CPT

## 2022-04-28 PROCEDURE — 74011250637 HC RX REV CODE- 250/637: Performed by: FAMILY MEDICINE

## 2022-04-28 PROCEDURE — 74011250636 HC RX REV CODE- 250/636

## 2022-04-28 PROCEDURE — G0378 HOSPITAL OBSERVATION PER HR: HCPCS

## 2022-04-28 RX ORDER — HYDROCODONE BITARTRATE AND ACETAMINOPHEN 5; 325 MG/1; MG/1
1 TABLET ORAL
Qty: 12 TABLET | Refills: 0 | Status: SHIPPED | OUTPATIENT
Start: 2022-04-28 | End: 2022-05-03

## 2022-04-28 RX ORDER — PREDNISONE 20 MG/1
TABLET ORAL
Qty: 12 TABLET | Refills: 0 | Status: SHIPPED | OUTPATIENT
Start: 2022-04-28

## 2022-04-28 RX ADMIN — REGADENOSON 0.4 MG: 0.08 INJECTION, SOLUTION INTRAVENOUS at 10:20

## 2022-04-28 RX ADMIN — NITROGLYCERIN 0.5 INCH: 20 OINTMENT TOPICAL at 11:42

## 2022-04-28 RX ADMIN — OXYCODONE 10 MG: 5 TABLET ORAL at 11:42

## 2022-04-28 RX ADMIN — FAMOTIDINE 20 MG: 20 TABLET, FILM COATED ORAL at 11:42

## 2022-04-28 RX ADMIN — ONDANSETRON 4 MG: 4 TABLET, ORALLY DISINTEGRATING ORAL at 14:26

## 2022-04-28 RX ADMIN — ASPIRIN 81 MG: 81 TABLET, CHEWABLE ORAL at 11:42

## 2022-04-28 RX ADMIN — OXYCODONE 10 MG: 5 TABLET ORAL at 17:44

## 2022-04-28 NOTE — PROGRESS NOTES
Problem: Falls - Risk of  Goal: *Absence of Falls  Description: Document Umm Silva Fall Risk and appropriate interventions in the flowsheet.   Outcome: Progressing Towards Goal  Note: Fall Risk Interventions:            Medication Interventions: Evaluate medications/consider consulting pharmacy,Patient to call before getting OOB,Teach patient to arise slowly                   Problem: Patient Education: Go to Patient Education Activity  Goal: Patient/Family Education  Outcome: Progressing Towards Goal     Problem: Pain  Goal: *Control of Pain  Outcome: Progressing Towards Goal     Problem: Patient Education: Go to Patient Education Activity  Goal: Patient/Family Education  Outcome: Progressing Towards Goal

## 2022-04-28 NOTE — DISCHARGE INSTRUCTIONS
DISCHARGE SUMMARY from Nurse    PATIENT INSTRUCTIONS:    After general anesthesia or intravenous sedation, for 24 hours or while taking prescription Narcotics:  · Limit your activities  · Do not drive and operate hazardous machinery  · Do not make important personal or business decisions  · Do  not drink alcoholic beverages  · If you have not urinated within 8 hours after discharge, please contact your surgeon on call. Report the following to your surgeon:  · Excessive pain, swelling, redness or odor of or around the surgical area  · Temperature over 100.5  · Nausea and vomiting lasting longer than 4 hours or if unable to take medications  · Any signs of decreased circulation or nerve impairment to extremity: change in color, persistent  numbness, tingling, coldness or increase pain  · Any questions    What to do at Home:  Recommended activity: Activity as tolerated,     If you experience any of the following symptoms nausea, vomiting, fever greater than 100.4, please follow up with PCP. *  Please give a list of your current medications to your Primary Care Provider. *  Please update this list whenever your medications are discontinued, doses are      changed, or new medications (including over-the-counter products) are added. *  Please carry medication information at all times in case of emergency situations. These are general instructions for a healthy lifestyle:    No smoking/ No tobacco products/ Avoid exposure to second hand smoke  Surgeon General's Warning:  Quitting smoking now greatly reduces serious risk to your health.     Obesity, smoking, and sedentary lifestyle greatly increases your risk for illness    A healthy diet, regular physical exercise & weight monitoring are important for maintaining a healthy lifestyle    You may be retaining fluid if you have a history of heart failure or if you experience any of the following symptoms:  Weight gain of 3 pounds or more overnight or 5 pounds in a week, increased swelling in our hands or feet or shortness of breath while lying flat in bed. Please call your doctor as soon as you notice any of these symptoms; do not wait until your next office visit. Patient armband removed and shredded. The discharge information has been reviewed with the patient. The patient verbalized understanding. Discharge medications reviewed with the patient and appropriate educational materials and side effects teaching were provided. _________________________________________________________________________________________________________________________    MyChart Activation    Thank you for requesting access to [facility]. Please follow the instructions below to securely access and download your online medical record. [facility] allows you to send messages to your doctor, view your test results, renew your prescriptions, schedule appointments, and more. How Do I Sign Up? 1. In your internet browser, go to [email or web address]  2. Click on the First Time User? Click Here link in the Sign In box. You will be redirect to the New Member Sign Up page. 3. Enter your [facility] Access Code exactly as it appears below. You will not need to use this code after youve completed the sign-up process. If you do not sign up before the expiration date, you must request a new code. [facility] Access Code: Activation code not generated  Current [facility] Status: Active (This is the date your [facility] access code will )    4. Enter the last four digits of your Social Security Number (xxxx) and Date of Birth (mm/dd/yyyy) as indicated and click Submit. You will be taken to the next sign-up page. 5. Create a [facility] ID. This will be your [facility] login ID and cannot be changed, so think of one that is secure and easy to remember. 6. Create a [facility] password. You can change your password at any time. 7. Enter your Password Reset Question and Answer.  This can be used at a later time if you forget your password. 8. Enter your e-mail address. You will receive e-mail notification when new information is available in [address] 9. Click Sign Up. You can now view and download portions of your medical record. 10. Click the Download Summary menu link to download a portable copy of your medical information. Additional Information    If you have questions, please visit the Frequently Asked Questions section of the [employer] website at [email or web address] Remember, [facility] is NOT to be used for urgent needs. For medical emergencies, dial 911.

## 2022-04-28 NOTE — PROGRESS NOTES
Problem: Falls - Risk of  Goal: *Absence of Falls  Description: Document Leeann Chicas Fall Risk and appropriate interventions in the flowsheet.   4/28/2022 1928 by Junior Cedric RN  Outcome: Resolved/Met  Note: Fall Risk Interventions:            Medication Interventions: Teach patient to arise slowly,Patient to call before getting OOB                4/28/2022 0856 by Junior Cedric RN  Outcome: Progressing Towards Goal  Note: Fall Risk Interventions:            Medication Interventions: Teach patient to arise slowly,Patient to call before getting OOB                   Problem: Patient Education: Go to Patient Education Activity  Goal: Patient/Family Education  4/28/2022 1928 by Junior Cedric RN  Outcome: Resolved/Met  4/28/2022 0856 by Junior Cedric RN  Outcome: Progressing Towards Goal     Problem: Patient Education: Go to Patient Education Activity  Goal: Patient/Family Education  4/28/2022 1928 by Junior Cedric RN  Outcome: Resolved/Met  4/28/2022 0856 by Junior Cedric RN  Outcome: Progressing Towards Goal     Problem: Deep Venous Thrombosis - Risk of  Goal: *Absence of deep venous thrombosis signs and symptoms(Stroke Metric)  4/28/2022 1928 by Junior Cedric RN  Outcome: Resolved/Met  4/28/2022 0856 by Junior Cedric RN  Outcome: Progressing Towards Goal  Goal: *Absence of impaired coagulation signs and symptoms  4/28/2022 1928 by Junior Cedric RN  Outcome: Resolved/Met  4/28/2022 0856 by Junior Cedric RN  Outcome: Progressing Towards Goal  Goal: *Knowledge of prescribed medications  4/28/2022 1928 by Junior Cedric RN  Outcome: Resolved/Met  4/28/2022 0856 by Junior Cedric RN  Outcome: Progressing Towards Goal  Goal: *Absence of bleeding  4/28/2022 1928 by Junior Cedric RN  Outcome: Resolved/Met  4/28/2022 0856 by Junior Cedric RN  Outcome: Progressing Towards Goal     Problem: Deep Venous Thrombosis - Risk of  Goal: *Absence of impaired coagulation signs and symptoms  4/28/2022 1928 by Carol Yeh RN  Outcome: Resolved/Met  4/28/2022 0856 by Carol Yeh RN  Outcome: Progressing Towards Goal     Problem: Deep Venous Thrombosis - Risk of  Goal: *Knowledge of prescribed medications  4/28/2022 1928 by Carol Yeh RN  Outcome: Resolved/Met  4/28/2022 0856 by Carol Yeh RN  Outcome: Progressing Towards Goal     Problem: Deep Venous Thrombosis - Risk of  Goal: *Absence of bleeding  4/28/2022 1928 by Carol Yeh RN  Outcome: Resolved/Met  4/28/2022 0856 by Carol Yeh RN  Outcome: Progressing Towards Goal     Problem: Patient Education: Go to Patient Education Activity  Goal: Patient/Family Education  4/28/2022 1928 by Carol Yeh RN  Outcome: Resolved/Met  4/28/2022 0856 by Carol Yeh RN  Outcome: Progressing Towards Goal

## 2022-04-28 NOTE — PROGRESS NOTES
DC Plan: home with family, follow up w/ PCP and ortho    Care manager met with patient at bedside who is awaiting notification of MRI results; c/o slight headache and chest pain now just residual soreness. 1300: hospitalist in to see patient; CM notified of intent to discharge and need to set up with PCP and ortho follow up; CMS requested to assist - anticipate patient will have a [facility] appointment and accepting ortho of Medicaid [place] plan.     Care Management Interventions  Transition of Care Consult (CM Consult): Discharge Planning  Support Systems: Spouse/Significant Other  Confirm Follow Up Transport: Family  The Plan for Transition of Care is Related to the Following Treatment Goals : left sided weakness  The Patient and/or Patient Representative was Provided with a Choice of Provider and Agrees with the Discharge Plan?: Yes  Name of the Patient Representative Who was Provided with a Choice of Provider and Agrees with the Discharge Plan: [facility], spouse  Discharge Location  Patient Expects to be Discharged to[de-identified] Home

## 2022-04-28 NOTE — CONSULTS
[facility] Consult Note      Patient: Amanda Henson MRN: [id number]  SSN: [id number]    YOB: 1980  Age: 43 y.o. Sex: female    Date of Consultation: 22  Referring Physician: Lianne Mahan MD  Reason for Consultation: Abnormal troponin    Chief complain: Neck pain, chest pain    HPI:  22-year-old female came to emergency with complaining of left upper chest pain, neck pain, left shoulder pain and some weakness in left arm. She woke up with left-sided neck pain which got worse and she came to emergency room. Currently she denies any chest pain but still has some left-sided neck pain. Chest pain was sharp, nonradiating and associated with shortness of breath denies any similar symptoms in the past.  The cardiology consult called in view of abnormal troponin. She is also complaining of palpitation off and on. She did have palpitation yesterday. Palpitation feels like rapid heartbeat lasts for few minutes. Denies any associated symptoms with palpitation. She is current smoker. She denies any family history of premature coronary artery disease.     Past Medical History:   Diagnosis Date    BMI 32.0-32.9,adult     Smoker 2022     Past Surgical History:   Procedure Laterality Date    HX APPENDECTOMY      HX GYN       x 3    HX ORTHOPAEDIC      knee     Current Facility-Administered Medications   Medication Dose Route Frequency    aspirin chewable tablet 81 mg  81 mg Oral DAILY    lidocaine 4 % patch 2 Patch  2 Patch TransDERmal Q24H    oxyCODONE IR (ROXICODONE) tablet 10 mg  10 mg Oral Q6H PRN    morphine injection 2 mg  2 mg IntraVENous Q4H PRN    nitroglycerin (NITROBID) 2 % ointment 0.5 Inch  0.5 Inch Topical BID    sodium chloride (NS) flush 5-40 mL  5-40 mL IntraVENous Q8H    sodium chloride (NS) flush 5-40 mL  5-40 mL IntraVENous PRN    acetaminophen (TYLENOL) tablet 650 mg  650 mg Oral Q6H PRN    Or    acetaminophen (TYLENOL) suppository 650 mg  650 mg Rectal Q6H PRN    polyethylene glycol (MIRALAX) packet 17 g  17 g Oral DAILY PRN    ondansetron (ZOFRAN ODT) tablet 4 mg  4 mg Oral Q8H PRN    Or    ondansetron (ZOFRAN) injection 4 mg  4 mg IntraVENous Q6H PRN    enoxaparin (LOVENOX) injection 40 mg  40 mg SubCUTAneous DAILY    famotidine (PEPCID) tablet 20 mg  20 mg Oral BID       Allergies and Intolerances:   No Known Allergies    Family History:   Family History   Problem Relation Age of Onset    Stroke Maternal Aunt        Social History:   She  reports that she has been smoking. She has been smoking about 0.50 packs per day. She has never used smokeless tobacco.  She  reports current alcohol use. Review of Systems:     Gen: No fever, chills, malaise, weight loss/gain. Heent: No headache, rhinorrhea, epistaxis, ear pain, hearing loss, sinus pain, neck pain/stiffness, sore throat. Heart:  Positive chest pain, palpitations, shortness of breath, No pnd, or orthopnea. Resp: No cough, hemoptysis, wheezing and dyspnea  GI: No nausea, vomiting, diarrhea, constipation, melena or hematochezia. : No urinary obstruction, dysuria or hematuria. Derm: No rash, new skin lesion or pruritis. Musc/skeletal: Positive bone or joint complains. Vasc: No edema, cyanosis or claudication. Endo: No heat/cold intolerance, no polyuria,polydipsia or polyphagia. Neuro: No unilateral weakness, numbness, tingling. No seizures. Heme: No easy bruising or bleeding. Physical:   Patient Vitals for the past 6 hrs:   Temp Pulse Resp BP SpO2   04/27/22 1959 98.6 °F (37 °C) 83 18 (!) 140/82 100 %   04/27/22 1600 -- 98 -- -- --   04/27/22 1424 -- -- -- (!) 146/82 --         Exam:   General Appearance: Comfortable, not using accessory muscles of respiration. HEENT: CRISTIANO. HEAD: Atraumatic  NECK: No JVD, no thyroidomeglay. CAROTIDS: No bruit  LUNGS: Clear bilaterally. HEART: S1+S2 audible, no murmur, no pericardial rub.      ABD: Non-tender, BS Audible    EXT: No edema, and no cyanosis. VASCULAR EXAM: Pulses are intact. PSYCHIATRIC EXAM: Mood is appropriate. MUSCULOSKELETAL: Grossly no joint deformity.   NEUROLOGICAL: AAO times 3, Motor and sensory sytem intact     Review of Data:   LABS:   Lab Results   Component Value Date/Time    WBC 6.3 04/27/2022 02:05 AM    HGB 10.5 (L) 04/27/2022 02:05 AM    HCT 35.0 04/27/2022 02:05 AM    PLATELET 312 41/54/3151 02:05 AM     Lab Results   Component Value Date/Time    Sodium 137 04/27/2022 02:05 AM    Potassium 3.9 04/27/2022 02:05 AM    Chloride 106 04/27/2022 02:05 AM    CO2 26 04/27/2022 02:05 AM    Glucose 132 (H) 04/27/2022 02:05 AM    BUN 7 04/27/2022 02:05 AM    Creatinine 0.69 04/27/2022 02:05 AM     Lab Results   Component Value Date/Time    Cholesterol, total 155 04/27/2022 02:05 AM    HDL Cholesterol 72 (H) 04/27/2022 02:05 AM    LDL, calculated 74.8 04/27/2022 02:05 AM    Triglyceride 41 04/27/2022 02:05 AM     No components found for: GPT  Lab Results   Component Value Date/Time    Hemoglobin A1c 5.2 04/27/2022 02:05 AM         Cardiology Procedures:   Results for orders placed or performed during the hospital encounter of 04/26/22   EKG, 12 LEAD, INITIAL   Result Value Ref Range    Ventricular Rate 93 BPM    Atrial Rate 93 BPM    P-R Interval 198 ms    QRS Duration 76 ms    Q-T Interval 350 ms    QTC Calculation (Bezet) 435 ms    Calculated P Axis 68 degrees    Calculated R Axis 43 degrees    Calculated T Axis 46 degrees    Diagnosis       Poor data quality, interpretation may be adversely affected  Normal sinus rhythm  Possible Left atrial enlargement  Septal infarct , age undetermined  Abnormal ECG  Confirmed by Dalila Espino MD, Shawna Renae ([id number]) on 4/26/2022 10:09:22 PM             Impression / Plan:    Patient Active Problem List   Diagnosis Code    BMI 32.0-32.9,adult Z68.32    Elevated troponin R77.8    Smoker F17.200    Lymph node enlargement R59.9    Neck pain on left side M54.2    Chest pain R07.9    Left arm weakness R29.898    Blurred vision, bilateral H53.8    Dysconjugate gaze H51.8    Marijuana use F12.90     Chest pain  Palpitation  Abnormal EKG  Abnormal troponin  No clear evidence of acute coronary syndrome  Cigarette smoker     Echocardiogram revealed        Left Ventricle: Normal left ventricular systolic function with a visually estimated EF of 60 - 65%. Left ventricle size is normal. Normal wall thickness. Normal wall motion. Grade I diastolic dysfunction with normal LAP.   Tricuspid Valve : Inadequate tricuspid valve regurgitant jet to comment on estimated PASP. CT chest /abdomen reported    No acute vascular or nonvascular abnormality identified.     Mildly enlarged right axillary/prepectoral lymph node measuring 1.6 cm is  nonspecific, likely reactive in nature. Recommend clinical follow-up. No other  suspicious adenopathy or lesion identified. CT head reported    1. No acute intracranial pathology. MRI brain reported    1. No acute infarct, mass effect, or herniation.     2. Reticular/linear susceptibility artifact in the lateral left frontal lobe  with suggestion of low-level T2 hyperintensity and blush of enhancement. This finding is nonspecific but may represent a small vascular malformation such as developmental venous anomaly, perhaps with adjacent perivascular spaces. Hemosiderin staining from remote hemorrhage is also possible. MRI cervical spine reported    1. Mildly motion degraded study.     2. Stranding of usual cervical lordosis without listhesis.     3. Cervical spondylosis at the C5-C6 level with left paracentral/subarticular disc protrusion. Disc material noted in close proximal patient exiting nerve roots at this level. Neural foramina patent bilaterally. No spinal canal stenosis.     4. Mild disc bulge C6-C7 without evidence of significant spinal canal narrowing or neuroforaminal impingement.     5. Normal cord morphology.  No abnormal internal cord signal or enhancement. MRI thoracic spine reported    1. Normal thoracic cord morphology without evidence of abnormal internal cord signal or enhancement.     2. Maintained vertebral body heights and preserved intervertebral disc spaces without significant spinal canal stenosis or neuroforaminal impingement.     3. Partial visualization of lower cervical spondylosis, seen to better advantage on same-day/contemporaneously performed cervical spine MRI. Plan:    Continue aspirin. Advised about stress test.    Schedule for Lexiscan stress test  Plan discussed with patient and she verbally understood and agreed.     NPO after midnight        Signed By: Christopher Boss MD     April 27, 2022

## 2022-04-28 NOTE — PROGRESS NOTES
Cardiology Progress Note        Patient: Tayla Organ        Sex: female          DOA: 4/26/2022  YOB: 1980      Age:  43 y.o.        LOS:  LOS: 2 days      Patient seen and examined, chart reviewed. Assessment/Plan     Patient Active Problem List   Diagnosis Code    BMI 32.0-32.9,adult Z68.32    Elevated troponin R77.8    Smoker F17.200    Lymph node enlargement R59.9    Neck pain on left side M54.2    Chest pain R07.9    Left arm weakness R29.898    Blurred vision, bilateral H53.8    Dysconjugate gaze H51.8    Marijuana use F12.90      Chest pain  Palpitation  Abnormal EKG  Abnormal troponin  No clear evidence of acute coronary syndrome  Cigarette smoker      Echocardiogram revealed         Left Ventricle: Normal left ventricular systolic function with a visually estimated EF of 60 - 65%. Left ventricle size is normal. Normal wall thickness. Normal wall motion. Grade I diastolic dysfunction with normal LAP.   Tricuspid Valve : Inadequate tricuspid valve regurgitant jet to comment on estimated PASP.          CT chest /abdomen reported     No acute vascular or nonvascular abnormality identified.     Mildly enlarged right axillary/prepectoral lymph node measuring 1.6 cm is  nonspecific, likely reactive in nature. Recommend clinical follow-up. No other  suspicious adenopathy or lesion identified.      CT head reported     1. No acute intracranial pathology.     MRI brain reported     1.  No acute infarct, mass effect, or herniation.     2.  Reticular/linear susceptibility artifact in the lateral left frontal lobe  with suggestion of low-level T2 hyperintensity and blush of enhancement. This finding is nonspecific but may represent a small vascular malformation such as developmental venous anomaly, perhaps with adjacent perivascular spaces. Hemosiderin staining from remote hemorrhage is also possible.     MRI cervical spine reported     1.  Mildly motion degraded study.     2. Stranding of usual cervical lordosis without listhesis.     3. Cervical spondylosis at the C5-C6 level with left paracentral/subarticular disc protrusion. Disc material noted in close proximal patient exiting nerve roots at this level. Neural foramina patent bilaterally. No spinal canal stenosis.     4. Mild disc bulge C6-C7 without evidence of significant spinal canal narrowing or neuroforaminal impingement.     5. Normal cord morphology. No abnormal internal cord signal or enhancement.     MRI thoracic spine reported     1. Normal thoracic cord morphology without evidence of abnormal internal cord signal or enhancement.     2. Maintained vertebral body heights and preserved intervertebral disc spaces without significant spinal canal stenosis or neuroforaminal impingement.     3. Partial visualization of lower cervical spondylosis, seen to better advantage on same-day/contemporaneously performed cervical spine MRI. Lexiscan Stress test :     1) Lexiscan stress test is negative for ischemia. 2) Baseline EKG revealed Sinus rhythm, no ST T changes. There was no ST changes after injection of Regadenoson or in recovery. 3) There was no perfusion defect in rest or stress study. 4) Calculated LVEF 81%. TID 0.95  5) Can not comment on exercise capacity due to pharmacological study. 6) No previous study to compare.     Plan:     continue current regimen. Advised about risk factor control. Cardiology sign off. Follow-up in cardiology clinic in 4-6 weeks after discharge. Subjective:    cc:  Denies any chest pain       REVIEW OF SYSTEMS:     General: No fevers or chills. Cardiovascular: No chest pain,No palpitations, No orthopnea, No PND, No leg swelling, No claudication  Pulmonary: No shortness of breath.    Gastrointestinal: No nausea, vomiting, bleeding  Neurology: No Dizziness    Objective:      Visit Vitals  BP (!) 169/93   Pulse 93   Temp 98.6 °F (37 °C)   Resp 18 Ht 5' 2\" (1.575 m)   Wt 79.8 kg (176 lb)   SpO2 100%   Breastfeeding No   BMI 32.19 kg/m²     Body mass index is 32.19 kg/m². Physical Exam:  General Appearance: Comfortable, not using accessory muscles of respiration. HEENT: CRISTIANO. HEAD: Atraumatic  NECK: No JVD, no thyroidomeglay. CAROTIDS: No bruit   LUNGS: Clear bilaterally. HEART: S1+S2 audible, no murmur, no pericardial rub.      NEUROLOGICAL: AAO times 3, No motor and sensory deficit    Medication:  Current Facility-Administered Medications   Medication Dose Route Frequency    aspirin chewable tablet 81 mg  81 mg Oral DAILY    lidocaine 4 % patch 2 Patch  2 Patch TransDERmal Q24H    oxyCODONE IR (ROXICODONE) tablet 10 mg  10 mg Oral Q6H PRN    morphine injection 2 mg  2 mg IntraVENous Q4H PRN    nitroglycerin (NITROBID) 2 % ointment 0.5 Inch  0.5 Inch Topical BID    sodium chloride (NS) flush 5-40 mL  5-40 mL IntraVENous Q8H    sodium chloride (NS) flush 5-40 mL  5-40 mL IntraVENous PRN    acetaminophen (TYLENOL) tablet 650 mg  650 mg Oral Q6H PRN    Or    acetaminophen (TYLENOL) suppository 650 mg  650 mg Rectal Q6H PRN    polyethylene glycol (MIRALAX) packet 17 g  17 g Oral DAILY PRN    ondansetron (ZOFRAN ODT) tablet 4 mg  4 mg Oral Q8H PRN    Or    ondansetron (ZOFRAN) injection 4 mg  4 mg IntraVENous Q6H PRN    enoxaparin (LOVENOX) injection 40 mg  40 mg SubCUTAneous DAILY    famotidine (PEPCID) tablet 20 mg  20 mg Oral BID               Lab/Data Reviewed:       Recent Labs     04/28/22  0035 04/27/22  0205 04/26/22  1815   WBC 6.7 6.3 6.2   HGB 9.6* 10.5* 10.6*   HCT 32.2* 35.0 35.8    295 315     Recent Labs     04/28/22  0035 04/27/22  0205 04/26/22  1815    137 135*   K 4.0 3.9 4.2    106 105   CO2 28 26 29   GLU 92 132* 83   BUN 8 7 10   CREA 0.76 0.69 1.00   CA 9.1 8.8 9.0         Signed By: Arnoldo Baca MD     April 28, 2022

## 2022-04-28 NOTE — PROGRESS NOTES
Bedside and Verbal shift change report given to SANTIAGO Murphy (oncoming nurse) by JOSÉ MANUEL Alexandra, RN (offgoing nurse). Report included the following information SBAR, Kardex, Intake/Output, MAR and Recent Results.

## 2022-04-28 NOTE — PROGRESS NOTES
Hospitalist Progress Note    Patient: Maryuri Michel MRN: [id number]  CSN: [id number]    YOB: 1980  Age: 43 y.o.   Sex: female    DOA: 4/26/2022 LOS:  LOS: 2 days          Chief Complaint:    Neck pain      Assessment/Plan     49-year-old female who is a smoker with obesity is admitted for left neck pain and arm weakness with elevated troponin and chest pain.     Elevated troponin with chest pain     Echo result in   troponins going down to normal  No chest pain now     Cardiology consult done, stress test completed, await results     Left-sided neck pain and arm weakness  MRI of the cervical and thoracic spine done-C5-C7  disc disease  Will need steroid trial,NSAIDS, and outpatient ortho appt     Blurred vision with dysconjugate gaze  TSH and prolactin levels ok  Brain MRI result is normal  Seen by neurology-outpatient follow up recommended, and consider migraine eval again also     Smoker-marijuana and cigarette  Smoking cessation recommended     Lymph node enlargement- isolated without worrisome findings on CT  Outpatient follow-up with primary care provider        Disposition :home later today  Set her up with PCP and orthopedics    Steroid taper, motrin PRN, short course norco for severe pain    Discussed all with patient  Patient Active Problem List   Diagnosis Code    BMI 32.0-32.9,adult Z68.32    Elevated troponin R77.8    Smoker F17.200    Lymph node enlargement R59.9    Neck pain on left side M54.2    Chest pain R07.9    Left arm weakness R29.898    Blurred vision, bilateral H53.8    Dysconjugate gaze H51.8    Marijuana use F12.90       Subjective:    Feeling anxious as she has no PCP< and wants to know what follow up she will have, she states she has been having these issues for \"months to years off and on\"  Review of systems:      Respiratory: denies SOB, cough  Cardiovascular: denies chest pain, palpitations  Gastrointestinal: denies nausea, vomiting, diarrhea      Vital signs/Intake and Output:  Visit Vitals  BP (!) 145/87   Pulse 68   Temp 98.7 °F (37.1 °C)   Resp 18   Ht 5' 2\" (1.575 m)   Wt 79.8 kg (176 lb)   SpO2 99%   Breastfeeding No   BMI 32.19 kg/m²     Current Shift:  No intake/output data recorded. Last three shifts:  04/26 1901 - 04/28 0700  In: 1130 [P.O.:1080; I.V.:50]  Out: -     Exam:    General: Well developed, alert, NAD, OX3  CVS:Regular rate and rhythm, no M/R/G, S1/S2 heard, no thrill  Lungs:Clear to auscultation bilaterally, no wheezes, rhonchi, or rales  Extremities: No C/C/E, pulses palpable 2+  Neuro:grossly normal , follows commands  Psych:appropriate                Labs: Results:       Chemistry Recent Labs     04/28/22 0035 04/27/22 0205 04/26/22 1815   GLU 92 132* 83    137 135*   K 4.0 3.9 4.2    106 105   CO2 28 26 29   BUN 8 7 10   CREA 0.76 0.69 1.00   CA 9.1 8.8 9.0   AGAP 4 5 1*   BUCR 11* 10* 10*   AP 68 71 81   TP 6.5 7.2 7.3   ALB 3.5 3.9 4.0   GLOB 3.0 3.3 3.3   AGRAT 1.2 1.2 1.2      CBC w/Diff Recent Labs     04/28/22 0035 04/27/22 0205 04/26/22 1815   WBC 6.7 6.3 6.2   RBC 4.06* 4.35 4.53   HGB 9.6* 10.5* 10.6*   HCT 32.2* 35.0 35.8    295 315   GRANS  --   --  36*   LYMPH  --   --  51   EOS  --   --  3      Cardiac Enzymes No results for input(s): CPK, CKND1, PAUL in the last 72 hours. No lab exists for component: CKRMB, TROIP   Coagulation Recent Labs     04/26/22 1815   PTP 13.2   INR 1.1   APTT 23.8       Lipid Panel Lab Results   Component Value Date/Time    Cholesterol, total 155 04/27/2022 02:05 AM    HDL Cholesterol 72 (H) 04/27/2022 02:05 AM    LDL, calculated 74.8 04/27/2022 02:05 AM    VLDL, calculated 8.2 04/27/2022 02:05 AM    Triglyceride 41 04/27/2022 02:05 AM    CHOL/HDL Ratio 2.2 04/27/2022 02:05 AM      BNP No results for input(s): BNPP in the last 72 hours.    Liver Enzymes Recent Labs     04/28/22  0035   TP 6.5   ALB 3.5   AP 68      Thyroid Studies Lab Results   Component Value Date/Time TSH 3.06 04/27/2022 02:05 AM        Procedures/imaging: see electronic medical records for all procedures/Xrays and details which were not copied into this note but were reviewed prior to creation of Romie Bronson MD

## 2022-04-28 NOTE — PROGRESS NOTES
NEUROLOGY PROGRESS NOTE        Patient: Ammon Moss        Sex: female          DOA: 4/26/2022  YOB: 1980      Age:  43 y.o.         LOS: 2 days     Identification:  [id number] right-handed female with history of migraines and hypertension, who presented with left-sided pain and weakness. SUBJECTIVE:   The patient was found in the bed. She denies remarkable chest pain or shoulder pain at this time. She is questioning me with her symptoms. Apparently, she was being followed by Dr. Adele Fuentes, her prior PCP, for migraine headaches. Currently, she does not have remarkable migraine headaches. She has been asking questions about her double vision with eye movement abnormalities, which may happen a couple of times a week. She was seen by neuro-ophthalmology in 2015, by Dr. Carolyn Millre. She does not remember the doctor's name but I was able to see the note from [facility] neuro-ophthalmology scanned in [place] [address]. Patient tells me that she is waking up with numbness and paresthesias in her hands and feet. This has been going on for some time. She has intermittent double vision but she denies remarkable headaches. Her brain MRI did not show any acute infarcts. There was a T2 hyperintensity on the lateral left frontal lobe. This finding seems to be similar to the finding in 2014 when she had the MRI. Currently, she feels better. She is being evaluated by cardiology as well. REVIEW OF SYSTEMS: Denies abdominal pain, nausea or vomiting. No fever or chills. OBJECTIVE:      Visit Vitals  BP (!) 148/92   Pulse 77   Temp 98.4 °F (36.9 °C)   Resp 18   Ht 5' 2\" (1.575 m)   Wt 79.8 kg (176 lb)   SpO2 100%   Breastfeeding No   BMI 32.19 kg/m²     Physical Exam:  GEN: Alert, NAD  EYES: conjunctiva normal, lids with out lesions  HEENT: MMM. HEART: RRR +S1 +S2  LUNGS: CTA B/L no rales or rhonchi. ABDOMEN: Soft, non-tender.   EXTREMITIES: No edema cyanosis  SKIN: no rashes or skin breakdown, no nodules  NEURO: Alert, oriented x3. Speech is fluent. Cranials: Face is symmetric. Smiles symmetrically. EOMI, VFF. Tongue is midline. Motor: Full strength at all sites. No pronator drift. Sensory: Intact to crude touch on all four. Coordination: Intact with no dysmetria during FNF.      Current Facility-Administered Medications   Medication Dose Route Frequency    aspirin chewable tablet 81 mg  81 mg Oral DAILY    lidocaine 4 % patch 2 Patch  2 Patch TransDERmal Q24H    oxyCODONE IR (ROXICODONE) tablet 10 mg  10 mg Oral Q6H PRN    morphine injection 2 mg  2 mg IntraVENous Q4H PRN    nitroglycerin (NITROBID) 2 % ointment 0.5 Inch  0.5 Inch Topical BID    sodium chloride (NS) flush 5-40 mL  5-40 mL IntraVENous Q8H    sodium chloride (NS) flush 5-40 mL  5-40 mL IntraVENous PRN    acetaminophen (TYLENOL) tablet 650 mg  650 mg Oral Q6H PRN    Or    acetaminophen (TYLENOL) suppository 650 mg  650 mg Rectal Q6H PRN    polyethylene glycol (MIRALAX) packet 17 g  17 g Oral DAILY PRN    ondansetron (ZOFRAN ODT) tablet 4 mg  4 mg Oral Q8H PRN    Or    ondansetron (ZOFRAN) injection 4 mg  4 mg IntraVENous Q6H PRN    enoxaparin (LOVENOX) injection 40 mg  40 mg SubCUTAneous DAILY    famotidine (PEPCID) tablet 20 mg  20 mg Oral BID       Laboratory  Recent Results (from the past 24 hour(s))   ECHO ADULT COMPLETE    Collection Time: 04/27/22  2:24 PM   Result Value Ref Range    IVSd 1.0 (A) 0.6 - 0.9 cm    LVIDd 4.0 3.9 - 5.3 cm    LVIDs 2.4 cm    LVOT Diameter 2.0 cm    LVPWd 0.9 0.6 - 0.9 cm    Global Longitudinal Strain -17.0 %    Global Longitudinal Strain -20.3 %    Global Longitudinal Strain -18.3 %    Global Longitudinal Strain -18.5 %    EF BP 71 55 - 100 %    LV Ejection Fraction A2C 74 %    LV Ejection Fraction A4C 69 %    LV EDV A2C 59 mL    LV EDV A4C 80 mL    LV EDV BP 68 56 - 104 mL    LV ESV A2C 16 mL    LV ESV A4C 25 mL    LV ESV BP 20 19 - 49 mL    RVIDd 3.1 cm    RV Free Wall Peak S' 14 cm/s    LA Diameter 2.7 cm    LA Volume 4C 22 22 - 52 mL    AV Peak Gradient 7 mmHg    AV Mean Gradient 5 mmHg    AV Peak Velocity 1.4 m/s    AV Mean Velocity 1.0 m/s    AV VTI 29.1 cm    MV A Velocity 0.91 m/s    MV E Velocity 0.92 m/s    LV E' Lateral Velocity 12 cm/s    LV E' Septal Velocity 9 cm/s    Pulmonary Artery EDP 3 mmHg    SD Max Velocity 0.9 m/s    TAPSE 2.2 1.7 cm    Aortic Root 2.7 cm    Fractional Shortening 2D 40 28 - 44 %    LV ESV Index BP 11 mL/m2    LV EDV Index BP 38 mL/m2    LV ESV Index A4C 14 mL/m2    LV EDV Index A4C 44 mL/m2    LV ESV Index A2C 9 mL/m2    LV EDV Index A2C 33 mL/m2    LVIDd Index 2.21 cm/m2    LVIDs Index 1.33 cm/m2    LV RWT Ratio 0.45     LV Mass 2D 118.2 67 - 162 g    LV Mass 2D Index 65.3 43 - 95 g/m2    MV E/A 1.01     E/E' Ratio (Averaged) 8.94     E/E' Lateral 7.67     E/E' Septal 10.22     LVOT Area 3.1 cm2    LA Volume Index 4C 12 (A) 16 - 34 mL/m2    LA Size Index 1.49 cm/m2    LA/AO Root Ratio 1.00     Ao Root Index 2.95 cm/m2   METABOLIC PANEL, COMPREHENSIVE    Collection Time: 04/28/22 12:35 AM   Result Value Ref Range    Sodium 137 136 - 145 mmol/L    Potassium 4.0 3.5 - 5.5 mmol/L    Chloride 105 100 - 111 mmol/L    CO2 28 21 - 32 mmol/L    Anion gap 4 3.0 - 18 mmol/L    Glucose 92 74 - 99 mg/dL    BUN 8 7.0 - 18 MG/DL    Creatinine 0.76 0.6 - 1.3 MG/DL    BUN/Creatinine ratio 11 (L) 12 - 20      GFR est AA >60 >60 ml/min/1.73m2    GFR est non-AA >60 >60 ml/min/1.73m2    Calcium 9.1 8.5 - 10.1 MG/DL    Bilirubin, total 0.2 0.2 - 1.0 MG/DL    ALT (SGPT) 17 13 - 56 U/L    AST (SGOT) 16 10 - 38 U/L    Alk.  phosphatase 68 45 - 117 U/L    Protein, total 6.5 6.4 - 8.2 g/dL    Albumin 3.5 3.4 - 5.0 g/dL    Globulin 3.0 2.0 - 4.0 g/dL    A-G Ratio 1.2 0.8 - 1.7     CBC W/O DIFF    Collection Time: 04/28/22 12:35 AM   Result Value Ref Range    WBC 6.7 4.6 - 13.2 K/uL    RBC 4.06 (L) 4.20 - 5.30 M/uL    HGB 9.6 (L) 12.0 - 16.0 g/dL    HCT 32.2 (L) 35.0 - 45.0 %    MCV 79.3 78.0 - 100.0 FL    MCH 23.6 (L) 24.0 - 34.0 PG    MCHC 29.8 (L) 31.0 - 37.0 g/dL    RDW 16.9 (H) 11.6 - 14.5 %    PLATELET 897 416 - 900 K/uL    MPV 10.0 9.2 - 11.8 FL    NRBC 0.0 0  WBC    ABSOLUTE NRBC 0.00 0.00 - 0.01 K/uL   NUCLEAR CARDIAC STRESS TEST    Collection Time: 04/28/22 10:45 AM   Result Value Ref Range    Stress Target  bpm    Exercise Duration Time 0 min    Exercuse Duration Seconds 56 sec    Stress Systolic  mmHg    Stress Diastolic BP 98 mmHg    Stress Peak  BPM    Baseline HR 81 BPM    Stress Estimated Workload 1.0 METS       Radiology:  MRI BRAIN W WO CONT    Result Date: 4/27/2022  EXAM: MRI BRAIN W/ CONTRAST INDICATION: Left-sided weakness COMPARISON: No prior study TECHNIQUE: Multiplanar multi sequence MR imaging of the brain was performed utilizing axial T2, FLAIR, diffusion, T2-weighted gradient echo, and multiplanar T1 pre and post contrast imaging with administration of gadolinium. _______________________ FINDINGS: Motion artifact is present which limits evaluation. VENTRICLES/EXTRA-AXIAL SPACES: The ventricles and sulci are normal in their size and configuration. BRAIN PARENCHYMA: No corpus callosal, brainstem, or cerebellar lesion is seen. No evidence of intracranial mass effect, midline shift, or herniation. No abnormal restricted diffusion to suggest acute infarct. Reticular and slightly linear susceptibility artifact is seen on gradient echo image 18 along the lateral aspect of the left frontal lobe cortex and subcortical white matter. Minimal T2 hyperintensity is present, perhaps with subtle blush of enhancement, not definitive. No surrounding edema or mass effect is present. VASCULATURE:  The major intracranial vascular flow voids are grossly normal. ORBITS: The visualized orbits are unremarkable. PARANASAL SINUSES: Visualized paranasal sinuses are clear. MASTOID AIR CELLS: Visualized mastoid air cells are clear. OSSEOUS STRUCTURES: Unremarkable OTHER: None. ________________________     Motion degraded study. 1.  No acute infarct, mass effect, or herniation. 2.  Reticular/linear susceptibility artifact in the lateral left frontal lobe with suggestion of low-level T2 hyperintensity and blush of enhancement. This finding is nonspecific but may represent a small vascular malformation such as developmental venous anomaly, perhaps with adjacent perivascular spaces. Hemosiderin staining from remote hemorrhage is also possible. MRI CERV SPINE W WO CONT    Result Date: 4/27/2022  EXAM: MRI OF THE CERVICAL SPINE, with and without IV CONTRAST CLINICAL INDICATION/HISTORY: left sided weakness   > Additional: Neck pain. COMPARISON: CT angiogram of the head and neck 4/26/2022.   > Reference Exam: None. TECHNIQUE: T1 weighted sagittal and axial, STIR and T2 FSE sagittal, T2 FSE axial images obtained before the uneventful intravenous initiation of gadolinium-based contrast. Postcontrast imaging and axial and sagittal planes with fat suppression of sagittal images. _______________ FINDINGS:   > From an image quality perspective, detail this study is degraded secondary to motion artifact. OSSEOUS, CERVICAL ALIGNMENT, CRANIOCERVICAL JUNCTION: There is straightening of usual cervical lordosis present without evidence of listhesis. Vertebral body heights are maintained. Mild intervertebral disc height loss and desiccation noted at the C5-C6 level. No suspicious marrow replacing lesion or process is demonstrated. No fracture. Vertebral body marrow signal intensity is normal. Atlanto-axial articulation is normal.  CERVICAL CORD, VISUALIZED POSTERIOR FOSSA: Visualized posterior fossa is unremarkable. No Chiari I malformation. Within the limitations of motion artifact, no abnormal internal cord signal or enhancement. Cord morphology appears within normal limits. PARASPINOUS SOFT TISSUES, VISUALIZED SOFT TISSUE NECK: Visualized soft tissues are unremarkable.  C2-C3: No significant disc pathology. No spinal canal or neuroforaminal stenosis. C3-C4: No significant disc pathology. No spinal canal or neuroforaminal stenosis. C4-C5: Small central disc protrusion with slight ventral impression of the thecal sac. No spinal canal stenosis. Neural foramina are patent. C5-C6: Disc bulge with left paracentral subarticular disc protrusion. Abutment of the cervical cord is noted without evidence of spinal canal stenosis. Disc protrusion noted in close proximal patient to the exiting left-sided nerve roots at this level. Neural foramina are patent. C6-C7: Mild disc bulge. No spinal canal stenosis. No neuroforaminal encroachment C7-T1: No significant disc pathology. No spinal canal or neuroforaminal stenosis. _______________     1. Mildly motion degraded study. 2. Stranding of usual cervical lordosis without listhesis. 3. Cervical spondylosis at the C5-C6 level with left paracentral/subarticular disc protrusion. Disc material noted in close proximal patient exiting nerve roots at this level. Neural foramina patent bilaterally. No spinal canal stenosis. 4. Mild disc bulge C6-C7 without evidence of significant spinal canal narrowing or neuroforaminal impingement. 5. Normal cord morphology. No abnormal internal cord signal or enhancement. MRI [facility] SPINE W WO CONT    Result Date: 4/27/2022  EXAM: MRI OF THE THORACIC SPINE, with and without IV CONTRAST CLINICAL INDICATION/HISTORY: left sided weakness   > Additional: Back pain, shortness of breath COMPARISON: None. > Reference Exam: None. TECHNIQUE: T1 weighted sagittal and axial, STIR and T2 FSE sagittal, T2 FSE axial before the uneventful intravenous administration of gadolinium-based contrast. Postcontrast imaging performed in axial and sagittal planes with fat suppression on sagittal images. _______________ FINDINGS:   > Similar to the cervical spine MR, detail of this examination is mildly degraded secondary to motion artifact.  OSSEOUS STRUCTURES, ALIGNMENT: There is normal kyphotic curvature of the thoracic spine. No listhesis. Vertebral body and intervertebral disc space heights are maintained. No suspicious osseous lesion. No fracture. Vertebral body marrow signal intensity is normal. THORACIC CORD: Cord morphology and signal intensity are unremarkable throughout. Conus medullaris ends at the inferior T12 vertebral body level. PARASPINOUS SOFT TISSUES, VISUALIZED THORAX: Visualized soft tissues are unremarkable. Correlation of axial and sagittal images reveals the following: At all levels from T1-T2 through to T12-L1, there is no evidence for significant disc pathology or proliferative change. No central canal or foraminal stenosis. Partial inclusion of spondylosis at both C5-C6 and C6-C7 levels, seen to better advantage on field-of-view performed for cervical spine MRI. _______________     1. Normal thoracic cord morphology without evidence of abnormal internal cord signal or enhancement. 2. Maintained vertebral body heights and preserved intervertebral disc spaces without significant spinal canal stenosis or neuroforaminal impingement. 3. Partial visualization of lower cervical spondylosis, seen to better advantage on same-day/contemporaneously performed cervical spine MRI. CT HEAD WO CONT    Result Date: 4/26/2022  EXAMINATION:  CT head noncontrast COMPARISON: None HISTORY: Stroke alert, history of stroke like symptoms. TECHNIQUE: Noncontrasted axial images were obtained through the patient's brain from the vertex of the skull through the skull base. Bone and soft tissue windows were reviewed. One or more dose reduction techniques were used on this CT: automated exposure control, adjustment of the mAs and/or kVp according to patient size, and iterative reconstruction techniques. The specific techniques used on this CT exam have been documented in the patient's electronic medical record.   Digital Imaging and Communications in Medicine (DICOM) format image data are available to nonaffiliated external healthcare facilities or entities on a secure, media free, reciprocally searchable basis with patient authorization for at least a 12-month period after this study. Ina Galindo FINDINGS: Brain architecture is normal. No mass effect, midline shift or hemorrhage. Ventricles are normal in size, position and configuration. No abnormal extra-axial fluid collections are seen. No territorial signs of infarct. The bony calvarium appears intact without acute displaced fracture. The visualized paranasal sinuses and mastoid air cells are aerated. 1. No acute intracranial pathology. Above code S stroke alert results relayed to patient's ER provider at 5:45 PM 4/26/2022. CTA HEAD NECK W WO CONT    Result Date: 4/27/2022  EXAM: CTA HEAD AND NECK INDICATION: Left neck pain and left arm weakness COMPARISON: No prior studies TECHNIQUE:  Multiple axial CT images of the neck were obtained extending from the level of the aortic arch to the skull base after the administration of the IV contrast utilizing a CTA protocol. Maximum intensity projection reconstructions were performed in multiple planes. Multiple axial CT images of the head were obtained extending from below the level of the skull base to the vertex after the administration of the IV contrast utilizing a CTA protocol. Maximum intensity projection reconstructions were performed in three planes. Additional 3 D reconstructions were performed at a separate workstation. One or more dose reduction techniques were used on this CT: automated exposure control, adjustment of the mAs and/or kVp according to patient's size, and iterative reconstruction techniques. The specific techniques utilized on this CT exam have been documented in the patient's electronic medical record.  Digital Imaging and Communications in Medicine (DICOM) format image data are available to nonaffiliated external healthcare facilities or entities on a secure, media free, reciprocally searchable basis with patient authorization for at least a 12-month period after this study. ___________________ FINDINGS: CTA NECK The origins of the great vessels along the aortic arch are unremarkable. The left common carotid is unremarkable. The left carotid bifurcation is unremarkable. Estimated minimal luminal diameter proximal left ICA 6.7 mm. Estimated luminal diameter of normal left ICA cervical segment is 4.1 mm. Estimated degree of stenosis of the left ICA based upon NASCET criteria is 0%. Remainder of left ICA cervical segment is unremarkable. No intimal flap is present to suggest dissection. The right common carotid is unremarkable. The right carotid bifurcation is unremarkable. Estimated minimal luminal diameter proximal right ICA 7.7 mm. Estimated luminal diameter of normal right ICA cervical segment is 4.1 mm. Estimated degree of stenosis of the right ICA based upon NASCET criteria is 0%. Remainder of right ICA cervical segment is unremarkable. No intimal flap is present to suggest dissection. The left vertebral artery is slightly larger than the right. No high grade vertebral artery stenosis is seen. No intimal flap is present to suggest vertebral artery dissection. Osseous structures are unremarkable. Visualized lung apices are clear. Several scattered bilateral cervical chain lymph nodes are present, not meeting size criteria for adenopathy. Additionally partially visualized subpectoral and axillary nodes are present, borderline enlarged. CTA HEAD There is no evidence of aneurysm. No focal carotid siphon stenosis is present. The carotid terminus is unremarkable. Right A1 segment is dominant. Left A1 segment is extremely hypoplastic/atretic. An anterior communicating artery is present with relatively equal sized A2 segments. The middle cerebral artery bifurcations are unremarkable. The vertebral arteries are both relatively small in size.  PICA origins are unremarkable. Basilar artery is overall small in size without superimposed stenosis. Posterior cerebral arteries are unremarkable. A small posterior communicating artery is present on the right with infundibular type origin. The dural venous sinuses are patent. ___________________     1. No definite large vessel occlusion. No evidence of carotid or vertebral artery dissection. 2.  No hemodynamically significant ICA stenosis, based on NASCET criteria. 3. No high-grade vertebral artery stenosis. 4. No high-grade intracranial stenosis. 5. Borderline enlarged prepectoral nodes. Additional several scattered bilateral cervical chain nodes although not meeting size criteria for adenopathy. These are nonspecific and perhaps reactive. Does patient has adenopathy elsewhere? Preliminary report of this study was provided by [facility]. XR CHEST PORT    Result Date: 4/27/2022  EXAM: XR CHEST PORT CLINICAL INDICATION/HISTORY: sob -Additional: None COMPARISON: None TECHNIQUE: Frontal view of the chest _______________ FINDINGS: HEART AND MEDIASTINUM: Normal cardiac size and mediastinal contours. LUNGS AND PLEURAL SPACES: No focal pneumonic consolidation, pneumothorax, or pleural effusion. BONY THORAX AND SOFT TISSUES: No acute osseous abnormality _______________     No acute findings in the chest.     CTA CHEST ABD PELV W CONT    Result Date: 4/26/2022  EXAM: CT angiogram of the chest, abdomen and pelvis INDICATION: Chest pain, concern for dissection COMPARISON: None. Technique: CT arteriogram of the chest abdomen pelvis was performed as a volume acquisition after intravenous contrast without complication. Coronal and sagittal MIP reconstructions were performed in order to create angiographic images of the arterial vasculature and to increase accuracy for detection of vascular abnormality.  One or more dose reduction techniques were used on this CT: automated exposure control, adjustment of the mAs and/or kVp according to patient size, and iterative reconstruction techniques. The specific techniques used on this CT exam have been documented in the patient's electronic medical record. Digital Imaging and Communications in Medicine (DICOM) format image data are available to nonaffiliated external healthcare facilities or entities on a secure, media free, reciprocally searchable basis with patient authorization for at least a 12-month period after this study. [facility] FINDINGS VASCULATURE: Thoracic and abdominal aorta nonaneurysmal. No dissection or acute abnormality. Visualized great vessels are widely patent. Visualized pulmonary arteries widely patent. Abdominal visceral and mesenteric vasculature widely patent. Iliac arterial vasculature widely patent. NONVASCULAR FINDINGS: CHEST: Unremarkable. MEDIASTINUM: Heart size normal. No pericardial effusion. LIVER, BILIARY: Liver is normal. No biliary dilation. Gallbladder is unremarkable. PANCREAS: Normal. SPLEEN: Normal. ADRENALS: Normal. KIDNEYS: Normal. LYMPH NODES: Right axillary/prepectoral lymph node measuring 1.6 cm. GASTROINTESTINAL TRACT: No bowel dilation or wall thickening. PELVIC ORGANS: Simple appearing right ovarian cyst measuring 3.7 cm. . BONES: No acute or aggressive osseous abnormalities identified. OTHER: None. _______________     No acute vascular or nonvascular abnormality identified. Mildly enlarged right axillary/prepectoral lymph node measuring 1.6 cm is nonspecific, likely reactive in nature. Recommend clinical follow-up. No other suspicious adenopathy or lesion identified. ECHO ADULT COMPLETE    Result Date: 4/27/2022    Left Ventricle: Normal left ventricular systolic function with a visually estimated EF of 60 - 65%. Left ventricle size is normal. Normal wall thickness. Normal wall motion. Grade I diastolic dysfunction with normal LAP.   Tricuspid Valve : Inadequate tricuspid valve regurgitant jet to comment on estimated PASP. ASSESSMENT/IMPRESSION:   Chest pain, neck pain and left-sided weakness, which may be due to cardiac pathology. I am suspicious about functional neurological disorder or atypical migraine attack. I cannot explain the neck pain. Her visual symptoms are not new and was regarded to be functional neurological disorder in 2015. She may need to be followed as outpatient for paresthesias and migraines. PLAN/RECOMMENDATIONS:  1. Continue cardiology recommendations  2. PT/OT and disposition planning  3. The patient needs to be seen by neurology as outpatient. Please do not hesitate to return with any questions.     Signed:  Broderick Jose MD  4/28/2022  1:45 PM

## 2022-04-28 NOTE — PROGRESS NOTES
Problem: Falls - Risk of  Goal: *Absence of Falls  Description: Document Nury Hwang Fall Risk and appropriate interventions in the flowsheet.   Outcome: Progressing Towards Goal  Note: Fall Risk Interventions:            Medication Interventions: Teach patient to arise slowly,Patient to call before getting OOB                   Problem: Patient Education: Go to Patient Education Activity  Goal: Patient/Family Education  Outcome: Progressing Towards Goal     Problem: Pain  Goal: *Control of Pain  Outcome: Progressing Towards Goal     Problem: Patient Education: Go to Patient Education Activity  Goal: Patient/Family Education  Outcome: Progressing Towards Goal     Problem: Deep Venous Thrombosis - Risk of  Goal: *Absence of deep venous thrombosis signs and symptoms(Stroke Metric)  Outcome: Progressing Towards Goal  Goal: *Absence of impaired coagulation signs and symptoms  Outcome: Progressing Towards Goal  Goal: *Knowledge of prescribed medications  Outcome: Progressing Towards Goal  Goal: *Absence of bleeding  Outcome: Progressing Towards Goal     Problem: Patient Education: Go to Patient Education Activity  Goal: Patient/Family Education  Outcome: Progressing Towards Goal

## 2022-04-29 NOTE — DISCHARGE SUMMARY
[facility] SUMMARY    Name:  Ella Garcia  MR#:   [id number]  :  1980  ACCOUNT #:  [de-identified]  ADMIT DATE:  2022  DISCHARGE DATE:  2022      DISCHARGE DIAGNOSES:  1. Cervical spine disease. 2.  Chest pain, resolved. 3.  Blurred vision, resolved. 4.  Tobacco and marijuana use, cessation advised. HOSPITAL CONSULTANTS:  1.  Dr. Satinder Lord MD, Neurology. 2.  Sasha Ren MD, Cardiology    PROCEDURES PERFORMED:  MRI of the cervical and thoracic spine, MRI of the brain and nuclear stress test.    HOSPITAL SUMMARY:  This is a 25-year-old female who came in complaining of left neck pain and arm pain. She had an elevated troponin and chest pain. Echo was done, it was normal.  Troponins trended downwards. She did not have evidence for acute coronary syndrome. MRI of the cervical and thoracic spine showed C5-C7 disk disease, blurred vision with disconjugate gaze was noted and complained about when she came in. Her TSH level was normal.  Brain MRI result was normal.  She was seen by Neurology, who recommended outpatient followup and consider migraine evaluation as well. There was lymph node enlargement on CT scan of chest, abdomen and pelvis in the right axillary prepectoral lymph node, nonspecific, likely reactive in nature. There was no suspicious adenopathy or lesion noted. White count is normal.  H and H 9.6 and 32.2, platelets 900,719. Electrolyte profile was normal.  LFTs were normal.  Hemoglobin A1c 5.2%. Pregnancy test was negative. Stress test came back negative for ischemia. She was cleared by Cardiology and by Neurology for discharge.   We had extensive discussion on the day of discharge about seeing Orthopedics as an outpatient for her cervical disease that could contribute to her neck and left shoulder and arm pain and occasional numbness and to follow up with Neurology also as an outpatient for chronic headaches and such that she has had in the past.  She was examined on the day of discharge that is in the progress note on 04/28. She was discharged in good condition after clearance by the appropriate specialist to go home on Norco 5 mg 1 tablet every 6-8 hours as needed for severe pain, small number only, no refill, and a steroid taper over the next 10 days. The patient was discharged to follow up with primary care in 1 week and Orthopedic Surgery. Dr. Al Trujillo at [facility] in 2 weeks. She was advised to quit smoking. Light activity over the next week, and she had no questions for her discharge. Thirty five minutes discharge time.       Mylo Heimlich, MD      [id number]  D:  04/29/2022 14:00  T:  04/29/2022 14:30  JOB #:  [id number]

## 2022-05-27 PROBLEM — R79.89 ELEVATED TROPONIN: Status: RESOLVED | Noted: 2022-04-27 | Resolved: 2022-05-27

## 2022-12-21 ENCOUNTER — HOSPITAL ENCOUNTER (EMERGENCY)
Age: 42
Discharge: HOME | End: 2022-12-22
Attending: FAMILY MEDICINE
Payer: MEDICAID

## 2022-12-21 DIAGNOSIS — M54.41 ACUTE MIDLINE LOW BACK PAIN WITH RIGHT-SIDED SCIATICA: Primary | ICD-10-CM

## 2022-12-21 PROCEDURE — 81001 URINALYSIS AUTO W/SCOPE: CPT

## 2022-12-21 PROCEDURE — 96375 TX/PRO/DX INJ NEW DRUG ADDON: CPT

## 2022-12-21 PROCEDURE — 96374 THER/PROPH/DIAG INJ IV PUSH: CPT

## 2022-12-21 PROCEDURE — 81025 URINE PREGNANCY TEST: CPT

## 2022-12-21 PROCEDURE — 99284 EMERGENCY DEPT VISIT MOD MDM: CPT

## 2022-12-21 NOTE — Clinical Note
[facility] [place]  [facility]  [address] [facility] [address] [phone]    Work/School Note    Date: 12/21/2022    To Whom It May concern: Belkys Contreras was seen and treated today in the emergency room by the following provider(s):  Attending Provider: Mercy Coles MD.      Belkys Contreras is excused from work/school on 12/22/22 and 12/23/22. She is medically clear to return to work/school on 12/24/2022.        Sincerely,          Stanford Juárez MD

## 2022-12-22 ENCOUNTER — APPOINTMENT (OUTPATIENT)
Dept: CT IMAGING | Age: 42
End: 2022-12-22
Attending: FAMILY MEDICINE
Payer: MEDICAID

## 2022-12-22 VITALS
BODY MASS INDEX: 33.13 KG/M2 | TEMPERATURE: 100.6 F | OXYGEN SATURATION: 97 % | SYSTOLIC BLOOD PRESSURE: 151 MMHG | HEIGHT: 62 IN | DIASTOLIC BLOOD PRESSURE: 84 MMHG | HEART RATE: 104 BPM | WEIGHT: 180 LBS | RESPIRATION RATE: 16 BRPM

## 2022-12-22 LAB
ALBUMIN SERPL-MCNC: 3.7 G/DL (ref 3.4–5)
ALBUMIN/GLOB SERPL: 1.2 {RATIO} (ref 0.8–1.7)
ALP SERPL-CCNC: 85 U/L (ref 45–117)
ALT SERPL-CCNC: 11 U/L (ref 13–56)
ANION GAP SERPL CALC-SCNC: 5 MMOL/L (ref 3–18)
APPEARANCE UR: CLEAR
AST SERPL-CCNC: 12 U/L (ref 10–38)
BACTERIA URNS QL MICRO: ABNORMAL /HPF
BASOPHILS # BLD: 0 K/UL (ref 0–0.1)
BASOPHILS NFR BLD: 0 % (ref 0–2)
BILIRUB SERPL-MCNC: 0.3 MG/DL (ref 0.2–1)
BILIRUB UR QL: NEGATIVE
BUN SERPL-MCNC: 6 MG/DL (ref 7–18)
BUN/CREAT SERPL: 10 (ref 12–20)
CALCIUM SERPL-MCNC: 8.6 MG/DL (ref 8.5–10.1)
CHLORIDE SERPL-SCNC: 107 MMOL/L (ref 100–111)
CO2 SERPL-SCNC: 26 MMOL/L (ref 21–32)
COLOR UR: YELLOW
CREAT SERPL-MCNC: 0.61 MG/DL (ref 0.6–1.3)
DIFFERENTIAL METHOD BLD: ABNORMAL
EOSINOPHIL # BLD: 0 K/UL (ref 0–0.4)
EOSINOPHIL NFR BLD: 0 % (ref 0–5)
EPITH CASTS URNS QL MICRO: ABNORMAL /LPF (ref 0–5)
ERYTHROCYTE [DISTWIDTH] IN BLOOD BY AUTOMATED COUNT: 15.3 % (ref 11.6–14.5)
GLOBULIN SER CALC-MCNC: 3 G/DL (ref 2–4)
GLUCOSE SERPL-MCNC: 95 MG/DL (ref 74–99)
GLUCOSE UR STRIP.AUTO-MCNC: NEGATIVE MG/DL
HCG UR QL: NEGATIVE
HCT VFR BLD AUTO: 31.9 % (ref 35–45)
HGB BLD-MCNC: 10.4 G/DL (ref 12–16)
HGB UR QL STRIP: ABNORMAL
IMM GRANULOCYTES # BLD AUTO: 0 K/UL (ref 0–0.04)
IMM GRANULOCYTES NFR BLD AUTO: 1 % (ref 0–0.5)
KETONES UR QL STRIP.AUTO: NEGATIVE MG/DL
LEUKOCYTE ESTERASE UR QL STRIP.AUTO: NEGATIVE
LYMPHOCYTES # BLD: 1.5 K/UL (ref 0.9–3.6)
LYMPHOCYTES NFR BLD: 28 % (ref 21–52)
MCH RBC QN AUTO: 25.8 PG (ref 24–34)
MCHC RBC AUTO-ENTMCNC: 32.6 G/DL (ref 31–37)
MCV RBC AUTO: 79.2 FL (ref 78–100)
MONOCYTES # BLD: 0.7 K/UL (ref 0.05–1.2)
MONOCYTES NFR BLD: 14 % (ref 3–10)
NEUTS SEG # BLD: 3 K/UL (ref 1.8–8)
NEUTS SEG NFR BLD: 57 % (ref 40–73)
NITRITE UR QL STRIP.AUTO: NEGATIVE
NRBC # BLD: 0 K/UL (ref 0–0.01)
NRBC BLD-RTO: 0 PER 100 WBC
PH UR STRIP: 7.5 [PH] (ref 5–8)
PLATELET # BLD AUTO: 323 K/UL (ref 135–420)
PMV BLD AUTO: 9.6 FL (ref 9.2–11.8)
POTASSIUM SERPL-SCNC: 3.5 MMOL/L (ref 3.5–5.5)
PROT SERPL-MCNC: 6.7 G/DL (ref 6.4–8.2)
PROT UR STRIP-MCNC: NEGATIVE MG/DL
RBC # BLD AUTO: 4.03 M/UL (ref 4.2–5.3)
RBC #/AREA URNS HPF: ABNORMAL /HPF (ref 0–5)
SODIUM SERPL-SCNC: 138 MMOL/L (ref 136–145)
SP GR UR REFRACTOMETRY: 1 (ref 1–1.03)
UROBILINOGEN UR QL STRIP.AUTO: 1 EU/DL (ref 0.2–1)
WBC # BLD AUTO: 5.3 K/UL (ref 4.6–13.2)
WBC URNS QL MICRO: NEGATIVE /HPF (ref 0–5)

## 2022-12-22 PROCEDURE — 74176 CT ABD & PELVIS W/O CONTRAST: CPT

## 2022-12-22 PROCEDURE — 96375 TX/PRO/DX INJ NEW DRUG ADDON: CPT

## 2022-12-22 PROCEDURE — 85025 COMPLETE CBC W/AUTO DIFF WBC: CPT

## 2022-12-22 PROCEDURE — 80053 COMPREHEN METABOLIC PANEL: CPT

## 2022-12-22 PROCEDURE — 74011250636 HC RX REV CODE- 250/636: Performed by: FAMILY MEDICINE

## 2022-12-22 PROCEDURE — 96374 THER/PROPH/DIAG INJ IV PUSH: CPT

## 2022-12-22 RX ORDER — PROCHLORPERAZINE EDISYLATE 5 MG/ML
10 INJECTION INTRAMUSCULAR; INTRAVENOUS
Status: COMPLETED | OUTPATIENT
Start: 2022-12-22 | End: 2022-12-22

## 2022-12-22 RX ORDER — CYCLOBENZAPRINE HCL 10 MG
10 TABLET ORAL
Qty: 20 TABLET | Refills: 0 | Status: SHIPPED | OUTPATIENT
Start: 2022-12-22

## 2022-12-22 RX ORDER — KETOROLAC TROMETHAMINE 30 MG/ML
30 INJECTION, SOLUTION INTRAMUSCULAR; INTRAVENOUS
Status: COMPLETED | OUTPATIENT
Start: 2022-12-22 | End: 2022-12-22

## 2022-12-22 RX ADMIN — SODIUM CHLORIDE 1000 ML: 9 INJECTION, SOLUTION INTRAVENOUS at 01:04

## 2022-12-22 RX ADMIN — KETOROLAC TROMETHAMINE 30 MG: 30 INJECTION, SOLUTION INTRAMUSCULAR; INTRAVENOUS at 02:18

## 2022-12-22 RX ADMIN — PROCHLORPERAZINE EDISYLATE 10 MG: 5 INJECTION INTRAMUSCULAR; INTRAVENOUS at 02:29

## 2022-12-22 NOTE — DISCHARGE INSTRUCTIONS
--Flexeril 10 mg every 8 hours as needed for muscle spasm. --Ibuprofen 600 mg every 6 hours as needed for pain. Take with food.

## 2022-12-22 NOTE — ED TRIAGE NOTES
Patient arrives ambulatory to ED with c/c of fever and feeling like something is on her bladder causing lower right sided pelvic pain radiating to lower back and down leg, onset earlier today.  Patient states she took her tylenol at 4pm.

## 2022-12-22 NOTE — ED PROVIDER NOTES
EMERGENCY DEPARTMENT HISTORY AND PHYSICAL EXAM          Date: 2022  Patient Name: Sue Li    History of Presenting Illness     Chief Complaint   Patient presents with    Fever    Back Pain       History Provided By: Patient    HPI: Sue Li is a 43 y.o. female, pmhx listed below, who presents  to the ED c/o pain in her lower back and buttock well as her lower abdomen and right flank. The pain started this morning. She has had pain in the right lower abdomen for the last several days, and then the low back pain started this morning. The back pain seems to be a bit worse. She is taken no over-the-counter medications. She reports that she has been urinating a lot but has no dysuria. No hematuria. This evening, she had a fever to 101.5. She had an episode in when she was hot and felt shaky. No vomiting or diarrhea. No vaginal discharge. PCP: None    Allergies: NKDA      There are no other complaints, changes, or physical findings at this time. Past History     Past Medical History:  Past Medical History:   Diagnosis Date    BMI 32.0-32.9,adult     Smoker 2022       Past Surgical History:  Past Surgical History:   Procedure Laterality Date    HX APPENDECTOMY      HX GYN       x 3    HX ORTHOPAEDIC      knee       Family History:  Family History   Problem Relation Age of Onset    Stroke Maternal Aunt        Social History:  Social History     Tobacco Use    Smoking status: Every Day     Packs/day: 0.50     Types: Cigarettes    Smokeless tobacco: Never   Substance Use Topics    Alcohol use: Yes    Drug use: Never     Comment: edibles       Allergies:  No Known Allergies      Review of Systems   Review of Systems   Constitutional:  Positive for fever. Respiratory:  Negative for cough and shortness of breath. Cardiovascular:  Negative for leg swelling. Gastrointestinal:  Positive for abdominal pain. Negative for constipation and diarrhea.    Endocrine: Positive for polyuria. Genitourinary:  Positive for pelvic pain. Musculoskeletal:  Positive for back pain. Negative for neck pain. Skin:  Negative for rash. Neurological:  Negative for dizziness, light-headedness and headaches. Hematological:  Does not bruise/bleed easily. Physical Exam   Physical Exam  Vitals reviewed. Constitutional:       General: She is not in acute distress. Appearance: She is well-developed. She is not diaphoretic. HENT:      Head: Normocephalic and atraumatic. Right Ear: External ear normal.      Left Ear: External ear normal.      Nose: Nose normal.      Mouth/Throat:      Mouth: Mucous membranes are moist.   Eyes:      General: No scleral icterus. Right eye: No discharge. Left eye: No discharge. Conjunctiva/sclera: Conjunctivae normal.      Pupils: Pupils are equal, round, and reactive to light. Neck:      Thyroid: No thyromegaly. Trachea: No tracheal deviation. Cardiovascular:      Rate and Rhythm: Normal rate and regular rhythm. Heart sounds: Normal heart sounds. No murmur heard. No friction rub. No gallop. Pulmonary:      Effort: Pulmonary effort is normal. No respiratory distress. Breath sounds: Normal breath sounds. No wheezing or rales. Chest:      Chest wall: No tenderness. Abdominal:      General: Bowel sounds are normal. There is no distension. Palpations: Abdomen is soft. Tenderness: There is abdominal tenderness in the right lower quadrant. There is right CVA tenderness. There is no left CVA tenderness, guarding or rebound. Negative signs include Spencer's sign. Musculoskeletal:         General: No tenderness or deformity. Normal range of motion. Cervical back: Normal range of motion and neck supple. Skin:     General: Skin is warm and dry. Neurological:      Mental Status: She is alert and oriented to person, place, and time. Cranial Nerves: No cranial nerve deficit.       Coordination: Coordination normal.      Deep Tendon Reflexes: Reflexes are normal and symmetric. Reflexes normal.       Diagnostic Study Results     Labs -     Recent Results (from the past 12 hour(s))   URINALYSIS W/ RFLX MICROSCOPIC    Collection Time: 12/21/22 11:00 PM   Result Value Ref Range    Color YELLOW      Appearance CLEAR      Specific gravity 1.005 1.005 - 1.030      pH (UA) 7.5 5.0 - 8.0      Protein Negative NEG mg/dL    Glucose Negative NEG mg/dL    Ketone Negative NEG mg/dL    Bilirubin Negative NEG      Blood TRACE (A) NEG      Urobilinogen 1.0 0.2 - 1.0 EU/dL    Nitrites Negative NEG      Leukocyte Esterase Negative NEG     URINE MICROSCOPIC ONLY    Collection Time: 12/21/22 11:00 PM   Result Value Ref Range    WBC Negative 0 - 5 /hpf    RBC 0 to 3 0 - 5 /hpf    Epithelial cells 1+ 0 - 5 /lpf    Bacteria 1+ (A) NEG /hpf   HCG URINE, QL    Collection Time: 12/21/22 11:00 PM   Result Value Ref Range    HCG urine, QL Negative NEG     CBC WITH AUTOMATED DIFF    Collection Time: 12/22/22 12:06 AM   Result Value Ref Range    WBC 5.3 4.6 - 13.2 K/uL    RBC 4.03 (L) 4.20 - 5.30 M/uL    HGB 10.4 (L) 12.0 - 16.0 g/dL    HCT 31.9 (L) 35.0 - 45.0 %    MCV 79.2 78.0 - 100.0 FL    MCH 25.8 24.0 - 34.0 PG    MCHC 32.6 31.0 - 37.0 g/dL    RDW 15.3 (H) 11.6 - 14.5 %    PLATELET 732 089 - 353 K/uL    MPV 9.6 9.2 - 11.8 FL    NRBC 0.0 0  WBC    ABSOLUTE NRBC 0.00 0.00 - 0.01 K/uL    NEUTROPHILS 57 40 - 73 %    LYMPHOCYTES 28 21 - 52 %    MONOCYTES 14 (H) 3 - 10 %    EOSINOPHILS 0 0 - 5 %    BASOPHILS 0 0 - 2 %    IMMATURE GRANULOCYTES 1 (H) 0.0 - 0.5 %    ABS. NEUTROPHILS 3.0 1.8 - 8.0 K/UL    ABS. LYMPHOCYTES 1.5 0.9 - 3.6 K/UL    ABS. MONOCYTES 0.7 0.05 - 1.2 K/UL    ABS. EOSINOPHILS 0.0 0.0 - 0.4 K/UL    ABS. BASOPHILS 0.0 0.0 - 0.1 K/UL    ABS. IMM.  GRANS. 0.0 0.00 - 0.04 K/UL    DF AUTOMATED     METABOLIC PANEL, COMPREHENSIVE    Collection Time: 12/22/22 12:06 AM   Result Value Ref Range    Sodium 138 136 - 145 mmol/L Potassium 3.5 3.5 - 5.5 mmol/L    Chloride 107 100 - 111 mmol/L    CO2 26 21 - 32 mmol/L    Anion gap 5 3.0 - 18 mmol/L    Glucose 95 74 - 99 mg/dL    BUN 6 (L) 7.0 - 18 MG/DL    Creatinine 0.61 0.6 - 1.3 MG/DL    BUN/Creatinine ratio 10 (L) 12 - 20      eGFR >60 >60 ml/min/1.73m2    Calcium 8.6 8.5 - 10.1 MG/DL    Bilirubin, total 0.3 0.2 - 1.0 MG/DL    ALT (SGPT) 11 (L) 13 - 56 U/L    AST (SGOT) 12 10 - 38 U/L    Alk. phosphatase 85 45 - 117 U/L    Protein, total 6.7 6.4 - 8.2 g/dL    Albumin 3.7 3.4 - 5.0 g/dL    Globulin 3.0 2.0 - 4.0 g/dL    A-G Ratio 1.2 0.8 - 1.7         Radiologic Studies -   CT ABD PELV WO CONT   Final Result      Nonobstructing left renal calculi. No hydronephrosis. CT Results  (Last 48 hours)                 12/22/22 0305  CT ABD PELV WO CONT Final result    Impression:      Nonobstructing left renal calculi. No hydronephrosis. Narrative:  EXAM: CT of the abdomen and pelvis       INDICATION: Pain. COMPARISON: March 27, 2014. TECHNIQUE: Axial CT imaging of the abdomen and pelvis was performed with   intravenous contrast. Multiplanar reformats were generated. Dose reduction   techniques used: automated exposure control, adjustment of the mAs and/or kVp   according to patient size, and iterative reconstruction techniques. Digital   imaging and communications in Medicine (DICOM) format image data are available   to nonaffiliated external healthcare facilities or entities on a secure, media   free, reciprocally searchable basis with patient authorization for at least 12   months after this study. _______________       FINDINGS:       LOWER CHEST: Unremarkable. LIVER, BILIARY: Liver is normal. No biliary dilation. Gallbladder is   unremarkable. PANCREAS: Normal.       SPLEEN: Normal.       ADRENALS: Normal.       KIDNEYS: There are scattered 1 to 2 mm left renal calculi. There is no   hydronephrosis.        LYMPH NODES: There are prominent retroperitoneal lymph nodes measuring up to 1.7   cm. GASTROINTESTINAL TRACT: No bowel dilation or wall thickening. PELVIC ORGANS: Unremarkable. VASCULATURE: Unremarkable. BONES: No acute or aggressive osseous abnormalities identified. OTHER: None.       _______________                 CXR Results  (Last 48 hours)      None              Medical Decision Making   I am the first provider for this patient. I reviewed the vital signs, available nursing notes, past medical history, past surgical history, family history and social history. Vital Signs-Reviewed the patient's vital signs. Patient Vitals for the past 12 hrs:   Temp Pulse Resp BP SpO2   12/22/22 0441 -- -- -- (!) 151/84 97 %   12/22/22 0345 -- -- -- -- 97 %   12/22/22 0330 -- -- -- -- 96 %   12/22/22 0158 -- (!) 104 16 -- --   12/22/22 0157 -- -- -- -- 97 %   12/21/22 2117 (!) 100.6 °F (38.1 °C) (!) 115 -- (!) 142/80 100 %         Records Reviewed: Nursing Notes, Old Medical Records, Previous Radiology Studies, and Previous Laboratory Studies    Provider Notes (Medical Decision Making):   MDM: Patient with some lower abdominal pain that is similar to the pain she has at mid-cycle, which would fit for ovulation. She has had an appendicitis. The back pain seems to be worse than the abdominal pain. S/P appendectomy. Pain improved after ketorolac and compazine, but she has dysphoria after the medications, which is not dystonia, but perhaps and akathesia. Have advised the patient to avoid compazine in the future. Offered pelvic exam, but patient does not think that it is a pelvic problem, with no vaginal discharge and a negative CT. Advised that the patient see her Ob-Gyn, and also have sent rx for Flexeril     ED Course:   Initial assessment performed. The patients presenting problems have been discussed, and they are in agreement with the care plan formulated and outlined with them.   I have encouraged them to ask questions as they arise throughout their visit. PROGRESS NOTE:    ED Course as of 12/24/22 0605   Thu Dec 22, 2022   0333 Pain better. Does not like the way the compazine made her feel, but her nausea is better. [VG]   0533 Back better but still feels strange from the compazine. [VG]      ED Course User Index  [VG] Aurea Silva MD        Discharge note:  Pt re-evaluated and noted to be feeling better , ready for discharge. Updated pt  on all final lab  findings. Will follow up as instructed . All questions have been answered, pt voiced understanding and agreement with plan. Specific return precautions provided as well as instructions to return to the ED should sx worsen at any time. Vital signs stable for discharge. Critical Care Time: 0      Diagnosis     Clinical Impression:  Sciatica, right sided    PLAN:  --Flexeril 10 mg every 8 hours as needed for muscle spasm. --Ibuprofen 600 mg every 6 hours as needed for pain. Take with food. 2. Ob-Gyn  3. PCP  Follow-up Information    None       Return to ED if worse     Disposition:  Home       Please note, this dictation was completed with [employer] the [employer] voice recognition software. Quite often unanticipated grammatical, syntax, homophones, and other interpretive errors are inadvertently transcribed by the computer software. Please disregard these errors. Please excuse any errors that have escaped final proof reading.

## 2024-02-24 ENCOUNTER — APPOINTMENT (OUTPATIENT)
Facility: HOSPITAL | Age: 44
End: 2024-02-24
Payer: COMMERCIAL

## 2024-02-24 ENCOUNTER — HOSPITAL ENCOUNTER (EMERGENCY)
Facility: HOSPITAL | Age: 44
Discharge: HOME | End: 2024-02-24
Attending: EMERGENCY MEDICINE
Payer: COMMERCIAL

## 2024-02-24 VITALS
TEMPERATURE: 98 F | HEART RATE: 90 BPM | DIASTOLIC BLOOD PRESSURE: 116 MMHG | WEIGHT: 149 LBS | SYSTOLIC BLOOD PRESSURE: 160 MMHG | RESPIRATION RATE: 18 BRPM | BODY MASS INDEX: 27.42 KG/M2 | OXYGEN SATURATION: 98 % | HEIGHT: 62 IN

## 2024-02-24 DIAGNOSIS — R07.9 RIGHT-SIDED CHEST PAIN: Primary | ICD-10-CM

## 2024-02-24 DIAGNOSIS — R10.9 RIGHT FLANK PAIN: ICD-10-CM

## 2024-02-24 LAB
ALBUMIN SERPL-MCNC: 4.1 G/DL (ref 3.4–5)
ALBUMIN/GLOB SERPL: 1.1 (ref 0.8–1.7)
ALP SERPL-CCNC: 84 U/L (ref 45–117)
ALT SERPL-CCNC: 14 U/L (ref 13–56)
ANION GAP SERPL CALC-SCNC: 6 MMOL/L (ref 3–18)
APPEARANCE UR: CLEAR
AST SERPL-CCNC: 24 U/L (ref 10–38)
BACTERIA URNS QL MICRO: NEGATIVE /HPF
BASOPHILS # BLD: 0 K/UL (ref 0–0.1)
BASOPHILS NFR BLD: 0 % (ref 0–2)
BILIRUB SERPL-MCNC: 0.4 MG/DL (ref 0.2–1)
BILIRUB UR QL: NEGATIVE
BUN SERPL-MCNC: 8 MG/DL (ref 7–18)
BUN/CREAT SERPL: 12 (ref 12–20)
CALCIUM SERPL-MCNC: 9.4 MG/DL (ref 8.5–10.1)
CHLORIDE SERPL-SCNC: 107 MMOL/L (ref 100–111)
CO2 SERPL-SCNC: 25 MMOL/L (ref 21–32)
COLOR UR: ABNORMAL
CREAT SERPL-MCNC: 0.69 MG/DL (ref 0.6–1.3)
DIFFERENTIAL METHOD BLD: NORMAL
EOSINOPHIL # BLD: 0.1 K/UL (ref 0–0.4)
EOSINOPHIL NFR BLD: 1 % (ref 0–5)
EPITH CASTS URNS QL MICRO: NORMAL /LPF (ref 0–5)
ERYTHROCYTE [DISTWIDTH] IN BLOOD BY AUTOMATED COUNT: 14 % (ref 11.6–14.5)
GLOBULIN SER CALC-MCNC: 3.6 G/DL (ref 2–4)
GLUCOSE SERPL-MCNC: 101 MG/DL (ref 74–99)
GLUCOSE UR STRIP.AUTO-MCNC: NEGATIVE MG/DL
HCG UR QL: NEGATIVE
HCG UR QL: NEGATIVE
HCT VFR BLD AUTO: 44.1 % (ref 35–45)
HGB BLD-MCNC: 14.3 G/DL (ref 12–16)
HGB UR QL STRIP: ABNORMAL
IMM GRANULOCYTES # BLD AUTO: 0 K/UL (ref 0–0.04)
IMM GRANULOCYTES NFR BLD AUTO: 0 % (ref 0–0.5)
KETONES UR QL STRIP.AUTO: NEGATIVE MG/DL
LEUKOCYTE ESTERASE UR QL STRIP.AUTO: ABNORMAL
LIPASE SERPL-CCNC: 22 U/L (ref 13–75)
LYMPHOCYTES # BLD: 2.3 K/UL (ref 0.9–3.6)
LYMPHOCYTES NFR BLD: 32 % (ref 21–52)
MCH RBC QN AUTO: 28.2 PG (ref 24–34)
MCHC RBC AUTO-ENTMCNC: 32.4 G/DL (ref 31–37)
MCV RBC AUTO: 87 FL (ref 78–100)
MONOCYTES # BLD: 0.5 K/UL (ref 0.05–1.2)
MONOCYTES NFR BLD: 7 % (ref 3–10)
NEUTS SEG # BLD: 4.4 K/UL (ref 1.8–8)
NEUTS SEG NFR BLD: 60 % (ref 40–73)
NITRITE UR QL STRIP.AUTO: NEGATIVE
NRBC # BLD: 0 K/UL (ref 0–0.01)
NRBC BLD-RTO: 0 PER 100 WBC
PH UR STRIP: 7.5 (ref 5–8)
PLATELET # BLD AUTO: 154 K/UL (ref 135–420)
PLATELET COMMENT: NORMAL
PMV BLD AUTO: 11.1 FL (ref 9.2–11.8)
POTASSIUM SERPL-SCNC: 4.6 MMOL/L (ref 3.5–5.5)
PROT SERPL-MCNC: 7.7 G/DL (ref 6.4–8.2)
PROT UR STRIP-MCNC: NEGATIVE MG/DL
RBC # BLD AUTO: 5.07 M/UL (ref 4.2–5.3)
RBC #/AREA URNS HPF: NORMAL /HPF (ref 0–5)
RBC MORPH BLD: NORMAL
SODIUM SERPL-SCNC: 138 MMOL/L (ref 136–145)
SP GR UR REFRACTOMETRY: 1.01 (ref 1–1.03)
UROBILINOGEN UR QL STRIP.AUTO: 0.2 EU/DL (ref 0.2–1)
WBC # BLD AUTO: 7.3 K/UL (ref 4.6–13.2)
WBC URNS QL MICRO: NORMAL /HPF (ref 0–5)

## 2024-02-24 PROCEDURE — 81001 URINALYSIS AUTO W/SCOPE: CPT

## 2024-02-24 PROCEDURE — 71250 CT THORAX DX C-: CPT

## 2024-02-24 PROCEDURE — 2580000003 HC RX 258: Performed by: EMERGENCY MEDICINE

## 2024-02-24 PROCEDURE — 93005 ELECTROCARDIOGRAM TRACING: CPT | Performed by: EMERGENCY MEDICINE

## 2024-02-24 PROCEDURE — 96374 THER/PROPH/DIAG INJ IV PUSH: CPT

## 2024-02-24 PROCEDURE — 80053 COMPREHEN METABOLIC PANEL: CPT

## 2024-02-24 PROCEDURE — 85025 COMPLETE CBC W/AUTO DIFF WBC: CPT

## 2024-02-24 PROCEDURE — 6360000002 HC RX W HCPCS: Performed by: EMERGENCY MEDICINE

## 2024-02-24 PROCEDURE — 96375 TX/PRO/DX INJ NEW DRUG ADDON: CPT

## 2024-02-24 PROCEDURE — 81025 URINE PREGNANCY TEST: CPT

## 2024-02-24 PROCEDURE — 71101 X-RAY EXAM UNILAT RIBS/CHEST: CPT

## 2024-02-24 PROCEDURE — 83690 ASSAY OF LIPASE: CPT

## 2024-02-24 PROCEDURE — 99285 EMERGENCY DEPT VISIT HI MDM: CPT

## 2024-02-24 RX ORDER — MORPHINE SULFATE 4 MG/ML
4 INJECTION, SOLUTION INTRAMUSCULAR; INTRAVENOUS
Status: COMPLETED | OUTPATIENT
Start: 2024-02-24 | End: 2024-02-24

## 2024-02-24 RX ORDER — 0.9 % SODIUM CHLORIDE 0.9 %
1000 INTRAVENOUS SOLUTION INTRAVENOUS ONCE
Status: COMPLETED | OUTPATIENT
Start: 2024-02-24 | End: 2024-02-24

## 2024-02-24 RX ORDER — ONDANSETRON 2 MG/ML
4 INJECTION INTRAMUSCULAR; INTRAVENOUS
Status: COMPLETED | OUTPATIENT
Start: 2024-02-24 | End: 2024-02-24

## 2024-02-24 RX ADMIN — ONDANSETRON 4 MG: 2 INJECTION INTRAMUSCULAR; INTRAVENOUS at 12:50

## 2024-02-24 RX ADMIN — MORPHINE SULFATE 4 MG: 4 INJECTION, SOLUTION INTRAMUSCULAR; INTRAVENOUS at 12:51

## 2024-02-24 RX ADMIN — SODIUM CHLORIDE 1000 ML: 9 INJECTION, SOLUTION INTRAVENOUS at 12:51

## 2024-02-24 NOTE — ED NOTES
Pt c/o RUQ pain radiating to R rib area. Pt denies injury or fall and states she woke up with this pain today. Pt states the pain is worse on movement. Pt speaking in complete sentences with non-labored respirations.

## 2024-02-24 NOTE — ED NOTES
Pt left tearful because all imaging including EKG was normal. Pt declined to have VS taken or any other tx at this time.

## 2024-02-24 NOTE — ED PROVIDER NOTES
[facility] EMERGENCY DEPT  EMERGENCY DEPARTMENT ENCOUNTER    Patient Name: Rafaela Gallegos  MRN: [id number]  YOB: 1980  Provider: Brendon Siddiqi MD  PCP: Staci Lyles APRN - NP   Time/Date of evaluation: 11:44 AM EST on 24    History of Presenting Illness     Chief Complaint   Patient presents with    Side Pain    Shortness of Breath       History Provided by: Patient   History is limited by: Nothing    HISTORY (Narative):   Rafaela Gallegos is a 43 y.o. female with a PMHX of appendectomy  who presents to the emergency department (room 10) by POV C/O right-sided rib pain onset 3:00 AM today. Associated sxs include nausea. Patient denies vomiting, dysuria or any other sxs or complaints.     Nursing Notes were all reviewed and agreed with or any disagreements were addressed in the HPI.    Past History     PAST MEDICAL HISTORY:  Past Medical History:   Diagnosis Date    BMI 32.0-32.9,adult     Smoker 2022       PAST SURGICAL HISTORY:  Past Surgical History:   Procedure Laterality Date    APPENDECTOMY      GYN       x 3    ORTHOPEDIC SURGERY      knee       FAMILY HISTORY:  Family History   Problem Relation Age of Onset    Stroke Maternal Aunt        SOCIAL HISTORY:  Social History     Tobacco Use    Smoking status: Every Day     Current packs/day: 0.50     Types: Cigarettes    Smokeless tobacco: Never   Substance Use Topics    Alcohol use: Yes    Drug use: Never       MEDICATIONS:  No current facility-administered medications for this encounter.     Current Outpatient Medications   Medication Sig Dispense Refill    cyclobenzaprine (FLEXERIL) 10 MG tablet Take 10 mg by mouth 3 times daily as needed         ALLERGIES:  Allergies   Allergen Reactions    Nsaids Nausea And Vomiting       SOCIAL DETERMINANTS OF HEALTH:  Social Determinants of Health     Tobacco Use: High Risk (2022)    Patient History     Smoking Tobacco Use: Every Day     Smokeless Tobacco Use: Never     Passive Exposure:

## 2024-02-24 NOTE — DISCHARGE INSTRUCTIONS
Follow-up with your primary care doctor.  You will need to call to make an appointment.  Return to the ED for worsening symptoms or for other concerns.

## 2024-02-25 LAB
EKG ATRIAL RATE: 82 BPM
EKG DIAGNOSIS: NORMAL
EKG P AXIS: 62 DEGREES
EKG P-R INTERVAL: 188 MS
EKG Q-T INTERVAL: 386 MS
EKG QRS DURATION: 76 MS
EKG QTC CALCULATION (BAZETT): 450 MS
EKG R AXIS: 15 DEGREES
EKG T AXIS: 38 DEGREES
EKG VENTRICULAR RATE: 82 BPM